# Patient Record
Sex: FEMALE | Race: WHITE | NOT HISPANIC OR LATINO | Employment: UNEMPLOYED | URBAN - METROPOLITAN AREA
[De-identification: names, ages, dates, MRNs, and addresses within clinical notes are randomized per-mention and may not be internally consistent; named-entity substitution may affect disease eponyms.]

---

## 2020-10-23 ENCOUNTER — LAB VISIT (OUTPATIENT)
Dept: LAB | Facility: OTHER | Age: 70
End: 2020-10-23
Attending: INTERNAL MEDICINE
Payer: MEDICARE

## 2020-10-23 DIAGNOSIS — E78.5 HYPERLIPIDEMIA: Primary | ICD-10-CM

## 2020-10-23 DIAGNOSIS — E11.9 DIABETES: ICD-10-CM

## 2020-10-23 LAB
ALBUMIN SERPL BCP-MCNC: 4.1 G/DL (ref 3.5–5.2)
ALP SERPL-CCNC: 66 U/L (ref 55–135)
ALT SERPL W/O P-5'-P-CCNC: 20 U/L (ref 10–44)
ANION GAP SERPL CALC-SCNC: 8 MMOL/L (ref 8–16)
AST SERPL-CCNC: 26 U/L (ref 10–40)
BASOPHILS # BLD AUTO: 0.04 K/UL (ref 0–0.2)
BASOPHILS NFR BLD: 1 % (ref 0–1.9)
BILIRUB DIRECT SERPL-MCNC: 0.3 MG/DL (ref 0.1–0.3)
BILIRUB SERPL-MCNC: 0.6 MG/DL (ref 0.1–1)
BILIRUB UR QL STRIP: NEGATIVE
BUN SERPL-MCNC: 18 MG/DL (ref 8–23)
CALCIUM SERPL-MCNC: 9.2 MG/DL (ref 8.7–10.5)
CHLORIDE SERPL-SCNC: 105 MMOL/L (ref 95–110)
CHOLEST SERPL-MCNC: 197 MG/DL (ref 120–199)
CHOLEST/HDLC SERPL: 2.4 {RATIO} (ref 2–5)
CLARITY UR: CLEAR
CO2 SERPL-SCNC: 28 MMOL/L (ref 23–29)
COLOR UR: YELLOW
CREAT SERPL-MCNC: 0.8 MG/DL (ref 0.5–1.4)
CRP SERPL-MCNC: 0.24 MG/L (ref 0–3.19)
DIFFERENTIAL METHOD: ABNORMAL
EOSINOPHIL # BLD AUTO: 0.1 K/UL (ref 0–0.5)
EOSINOPHIL NFR BLD: 2 % (ref 0–8)
ERYTHROCYTE [DISTWIDTH] IN BLOOD BY AUTOMATED COUNT: 12.1 % (ref 11.5–14.5)
EST. GFR  (AFRICAN AMERICAN): >60 ML/MIN/1.73 M^2
EST. GFR  (NON AFRICAN AMERICAN): >60 ML/MIN/1.73 M^2
ESTIMATED AVG GLUCOSE: 108 MG/DL (ref 68–131)
GLUCOSE SERPL-MCNC: 95 MG/DL (ref 70–110)
GLUCOSE UR QL STRIP: NEGATIVE
HBA1C MFR BLD HPLC: 5.4 % (ref 4–5.6)
HCT VFR BLD AUTO: 42.1 % (ref 37–48.5)
HDLC SERPL-MCNC: 83 MG/DL (ref 40–75)
HDLC SERPL: 42.1 % (ref 20–50)
HGB BLD-MCNC: 13.7 G/DL (ref 12–16)
HGB UR QL STRIP: NEGATIVE
IMM GRANULOCYTES # BLD AUTO: 0 K/UL (ref 0–0.04)
IMM GRANULOCYTES NFR BLD AUTO: 0 % (ref 0–0.5)
KETONES UR QL STRIP: NEGATIVE
LDLC SERPL CALC-MCNC: 105.4 MG/DL (ref 63–159)
LEUKOCYTE ESTERASE UR QL STRIP: NEGATIVE
LYMPHOCYTES # BLD AUTO: 1.6 K/UL (ref 1–4.8)
LYMPHOCYTES NFR BLD: 40.6 % (ref 18–48)
MCH RBC QN AUTO: 31.5 PG (ref 27–31)
MCHC RBC AUTO-ENTMCNC: 32.5 G/DL (ref 32–36)
MCV RBC AUTO: 97 FL (ref 82–98)
MONOCYTES # BLD AUTO: 0.3 K/UL (ref 0.3–1)
MONOCYTES NFR BLD: 7.1 % (ref 4–15)
NEUTROPHILS # BLD AUTO: 2 K/UL (ref 1.8–7.7)
NEUTROPHILS NFR BLD: 49.3 % (ref 38–73)
NITRITE UR QL STRIP: NEGATIVE
NONHDLC SERPL-MCNC: 114 MG/DL
NRBC BLD-RTO: 0 /100 WBC
PH UR STRIP: 8 [PH] (ref 5–8)
PLATELET # BLD AUTO: 247 K/UL (ref 150–350)
PMV BLD AUTO: 9.5 FL (ref 9.2–12.9)
POTASSIUM SERPL-SCNC: 4 MMOL/L (ref 3.5–5.1)
PROT SERPL-MCNC: 6.7 G/DL (ref 6–8.4)
PROT UR QL STRIP: NEGATIVE
RBC # BLD AUTO: 4.35 M/UL (ref 4–5.4)
SODIUM SERPL-SCNC: 141 MMOL/L (ref 136–145)
SP GR UR STRIP: 1.01 (ref 1–1.03)
TRIGL SERPL-MCNC: 43 MG/DL (ref 30–150)
URN SPEC COLLECT METH UR: NORMAL
UROBILINOGEN UR STRIP-ACNC: NEGATIVE EU/DL
WBC # BLD AUTO: 3.97 K/UL (ref 3.9–12.7)

## 2020-10-23 PROCEDURE — 86141 C-REACTIVE PROTEIN HS: CPT | Mod: DBM

## 2020-10-23 PROCEDURE — 83036 HEMOGLOBIN GLYCOSYLATED A1C: CPT | Mod: DBM

## 2020-10-23 PROCEDURE — 80061 LIPID PANEL: CPT

## 2020-10-23 PROCEDURE — 81003 URINALYSIS AUTO W/O SCOPE: CPT

## 2020-10-23 PROCEDURE — 80061 LIPID PANEL: CPT | Mod: 91

## 2020-10-23 PROCEDURE — 85025 COMPLETE CBC W/AUTO DIFF WBC: CPT

## 2020-10-23 PROCEDURE — 80076 HEPATIC FUNCTION PANEL: CPT

## 2020-10-23 PROCEDURE — 80048 BASIC METABOLIC PNL TOTAL CA: CPT

## 2020-10-26 LAB — LDLC SERPL-MCNC: 111 MG/DL

## 2020-10-28 LAB
CHOLEST SERPL-MCNC: 97 MG/DL
HDL SERPL QN: 8.8 NM
HDL SERPL-SCNC: 25.6 UMOL/L
HDLC SERPL-MCNC: 40 MG/DL (ref 40–59)
HLD.LARGE SERPL-SCNC: 2.9 UMOL/L
LDL SERPL QN: 20.6 NM
LDL SERPL-SCNC: 739 NMOL/L
LDL SMALL SERPL-SCNC: 460 NMOL/L
LDLC SERPL CALC-MCNC: 48 MG/DL
PATHOLOGY STUDY: ABNORMAL
TRIGL SERPL-MCNC: 44 MG/DL (ref 30–149)
VLDL LARGE SERPL-SCNC: <1.5 NMOL/L
VLDL SERPL QN: 49.1 NM

## 2020-12-16 ENCOUNTER — LAB VISIT (OUTPATIENT)
Dept: LAB | Facility: OTHER | Age: 70
End: 2020-12-16
Payer: MEDICARE

## 2020-12-16 DIAGNOSIS — E78.00 HYPERCHOLESTEROLEMIA: ICD-10-CM

## 2020-12-16 DIAGNOSIS — R73.9 HYPERGLYCEMIA: Primary | ICD-10-CM

## 2020-12-16 LAB
CHOLEST SERPL-MCNC: 184 MG/DL (ref 120–199)
CHOLEST/HDLC SERPL: 2.1 {RATIO} (ref 2–5)
ESTIMATED AVG GLUCOSE: 111 MG/DL (ref 68–131)
GLUCOSE SERPL-MCNC: 100 MG/DL (ref 70–110)
HBA1C MFR BLD HPLC: 5.5 % (ref 4–5.6)
HDLC SERPL-MCNC: 88 MG/DL (ref 40–75)
HDLC SERPL: 47.8 % (ref 20–50)
LDLC SERPL CALC-MCNC: 87.8 MG/DL (ref 63–159)
NONHDLC SERPL-MCNC: 96 MG/DL
TRIGL SERPL-MCNC: 41 MG/DL (ref 30–150)

## 2020-12-16 PROCEDURE — 36415 COLL VENOUS BLD VENIPUNCTURE: CPT

## 2020-12-16 PROCEDURE — 80061 LIPID PANEL: CPT

## 2020-12-16 PROCEDURE — 80061 LIPID PANEL: CPT | Mod: 91

## 2020-12-16 PROCEDURE — 83036 HEMOGLOBIN GLYCOSYLATED A1C: CPT

## 2020-12-16 PROCEDURE — 82947 ASSAY GLUCOSE BLOOD QUANT: CPT

## 2020-12-18 LAB — LDLC SERPL-MCNC: 98 MG/DL

## 2020-12-20 LAB
CHOLEST SERPL-MCNC: 188 MG/DL
HDL SERPL QN: 10 NM
HDL SERPL-SCNC: 36.2 UMOL/L
HDLC SERPL-MCNC: 89 MG/DL (ref 40–59)
HLD.LARGE SERPL-SCNC: 14.4 UMOL/L
LDL SERPL QN: 21.2 NM
LDL SERPL-SCNC: 914 NMOL/L
LDL SMALL SERPL-SCNC: <165 NMOL/L
LDLC SERPL CALC-MCNC: 90 MG/DL
PATHOLOGY STUDY: ABNORMAL
TRIGL SERPL-MCNC: 45 MG/DL (ref 30–149)
VLDL LARGE SERPL-SCNC: <1.5 NMOL/L
VLDL SERPL QN: 46.5 NM

## 2021-01-11 ENCOUNTER — OFFICE VISIT (OUTPATIENT)
Dept: DERMATOLOGY | Facility: CLINIC | Age: 71
End: 2021-01-11
Payer: MEDICARE

## 2021-01-11 DIAGNOSIS — R23.3 TALON NOIR: Primary | ICD-10-CM

## 2021-01-11 DIAGNOSIS — D18.01 CHERRY ANGIOMA: ICD-10-CM

## 2021-01-11 DIAGNOSIS — Z12.83 SKIN CANCER SCREENING: ICD-10-CM

## 2021-01-11 PROCEDURE — 99203 OFFICE O/P NEW LOW 30 MIN: CPT | Mod: S$PBB,,, | Performed by: DERMATOLOGY

## 2021-01-11 PROCEDURE — 99999 PR PBB SHADOW E&M-EST. PATIENT-LVL I: ICD-10-PCS | Mod: PBBFAC,,,

## 2021-01-11 PROCEDURE — 99211 OFF/OP EST MAY X REQ PHY/QHP: CPT | Mod: PBBFAC

## 2021-01-11 PROCEDURE — 99999 PR PBB SHADOW E&M-EST. PATIENT-LVL I: CPT | Mod: PBBFAC,,,

## 2021-01-11 PROCEDURE — 99203 PR OFFICE/OUTPT VISIT, NEW, LEVL III, 30-44 MIN: ICD-10-PCS | Mod: S$PBB,,, | Performed by: DERMATOLOGY

## 2021-05-12 ENCOUNTER — PATIENT MESSAGE (OUTPATIENT)
Dept: UROLOGY | Facility: CLINIC | Age: 71
End: 2021-05-12

## 2021-05-14 ENCOUNTER — OFFICE VISIT (OUTPATIENT)
Dept: UROGYNECOLOGY | Facility: CLINIC | Age: 71
End: 2021-05-14
Payer: MEDICARE

## 2021-05-14 ENCOUNTER — LAB VISIT (OUTPATIENT)
Dept: LAB | Facility: OTHER | Age: 71
End: 2021-05-14
Attending: INTERNAL MEDICINE
Payer: MEDICARE

## 2021-05-14 VITALS
BODY MASS INDEX: 20.99 KG/M2 | DIASTOLIC BLOOD PRESSURE: 68 MMHG | SYSTOLIC BLOOD PRESSURE: 119 MMHG | HEIGHT: 68 IN | WEIGHT: 138.5 LBS

## 2021-05-14 DIAGNOSIS — R33.9 INCOMPLETE BLADDER EMPTYING: Primary | ICD-10-CM

## 2021-05-14 DIAGNOSIS — E78.5 HYPERLIPIDEMIA: Primary | ICD-10-CM

## 2021-05-14 DIAGNOSIS — N39.8 DYSFUNCTIONAL VOIDING OF URINE: ICD-10-CM

## 2021-05-14 DIAGNOSIS — R73.03 PREDIABETES: ICD-10-CM

## 2021-05-14 LAB
ALBUMIN SERPL BCP-MCNC: 3.8 G/DL (ref 3.5–5.2)
ALP SERPL-CCNC: 71 U/L (ref 55–135)
ALT SERPL W/O P-5'-P-CCNC: 24 U/L (ref 10–44)
ANION GAP SERPL CALC-SCNC: 7 MMOL/L (ref 8–16)
AST SERPL-CCNC: 27 U/L (ref 10–40)
BASOPHILS # BLD AUTO: 0.04 K/UL (ref 0–0.2)
BASOPHILS NFR BLD: 0.8 % (ref 0–1.9)
BILIRUB DIRECT SERPL-MCNC: 0.2 MG/DL (ref 0.1–0.3)
BILIRUB SERPL-MCNC: 0.6 MG/DL (ref 0.1–1)
BILIRUB UR QL STRIP: NEGATIVE
BUN SERPL-MCNC: 13 MG/DL (ref 8–23)
CALCIUM SERPL-MCNC: 8.8 MG/DL (ref 8.7–10.5)
CHLORIDE SERPL-SCNC: 106 MMOL/L (ref 95–110)
CHOLEST SERPL-MCNC: 143 MG/DL (ref 120–199)
CO2 SERPL-SCNC: 29 MMOL/L (ref 23–29)
CREAT SERPL-MCNC: 0.8 MG/DL (ref 0.5–1.4)
CRP SERPL-MCNC: 0.2 MG/L (ref 0–8.2)
DIFFERENTIAL METHOD: ABNORMAL
EOSINOPHIL # BLD AUTO: 0.1 K/UL (ref 0–0.5)
EOSINOPHIL NFR BLD: 2.1 % (ref 0–8)
ERYTHROCYTE [DISTWIDTH] IN BLOOD BY AUTOMATED COUNT: 12.5 % (ref 11.5–14.5)
EST. GFR  (AFRICAN AMERICAN): >60 ML/MIN/1.73 M^2
EST. GFR  (NON AFRICAN AMERICAN): >60 ML/MIN/1.73 M^2
ESTIMATED AVG GLUCOSE: 114 MG/DL (ref 68–131)
GLUCOSE SERPL-MCNC: 93 MG/DL (ref 70–110)
GLUCOSE UR QL STRIP: NEGATIVE
HBA1C MFR BLD: 5.6 % (ref 4–5.6)
HCT VFR BLD AUTO: 41.7 % (ref 37–48.5)
HDLC SERPL-MCNC: 83 MG/DL (ref 40–75)
HGB BLD-MCNC: 13.7 G/DL (ref 12–16)
IMM GRANULOCYTES # BLD AUTO: 0.01 K/UL (ref 0–0.04)
IMM GRANULOCYTES NFR BLD AUTO: 0.2 % (ref 0–0.5)
KETONES UR QL STRIP: NEGATIVE
LEUKOCYTE ESTERASE UR QL STRIP: NEGATIVE
LYMPHOCYTES # BLD AUTO: 1.3 K/UL (ref 1–4.8)
LYMPHOCYTES NFR BLD: 25.2 % (ref 18–48)
MCH RBC QN AUTO: 31.9 PG (ref 27–31)
MCHC RBC AUTO-ENTMCNC: 32.9 G/DL (ref 32–36)
MCV RBC AUTO: 97 FL (ref 82–98)
MONOCYTES # BLD AUTO: 0.3 K/UL (ref 0.3–1)
MONOCYTES NFR BLD: 6.6 % (ref 4–15)
NEUTROPHILS # BLD AUTO: 3.3 K/UL (ref 1.8–7.7)
NEUTROPHILS NFR BLD: 65.1 % (ref 38–73)
NRBC BLD-RTO: 0 /100 WBC
PH, POC UA: 6
PLATELET # BLD AUTO: 237 K/UL (ref 150–450)
PMV BLD AUTO: 9.8 FL (ref 9.2–12.9)
POC BLOOD, URINE: NEGATIVE
POC NITRATES, URINE: NEGATIVE
POTASSIUM SERPL-SCNC: 4.1 MMOL/L (ref 3.5–5.1)
PROT SERPL-MCNC: 6.7 G/DL (ref 6–8.4)
PROT UR QL STRIP: NEGATIVE
RBC # BLD AUTO: 4.3 M/UL (ref 4–5.4)
SODIUM SERPL-SCNC: 142 MMOL/L (ref 136–145)
SP GR UR STRIP: 1.01 (ref 1–1.03)
TRIGL SERPL-MCNC: 35 MG/DL (ref 30–150)
UROBILINOGEN UR STRIP-ACNC: 0.1 (ref 0.1–1.1)
WBC # BLD AUTO: 5.12 K/UL (ref 3.9–12.7)

## 2021-05-14 PROCEDURE — 82465 ASSAY BLD/SERUM CHOLESTEROL: CPT | Performed by: INTERNAL MEDICINE

## 2021-05-14 PROCEDURE — 99213 OFFICE O/P EST LOW 20 MIN: CPT | Mod: PBBFAC | Performed by: OBSTETRICS & GYNECOLOGY

## 2021-05-14 PROCEDURE — 99203 OFFICE O/P NEW LOW 30 MIN: CPT | Mod: S$PBB,,, | Performed by: OBSTETRICS & GYNECOLOGY

## 2021-05-14 PROCEDURE — 83704 LIPOPROTEIN BLD QUAN PART: CPT | Performed by: INTERNAL MEDICINE

## 2021-05-14 PROCEDURE — 99999 PR PBB SHADOW E&M-EST. PATIENT-LVL III: ICD-10-PCS | Mod: PBBFAC,,, | Performed by: OBSTETRICS & GYNECOLOGY

## 2021-05-14 PROCEDURE — 86140 C-REACTIVE PROTEIN: CPT | Performed by: INTERNAL MEDICINE

## 2021-05-14 PROCEDURE — 83718 ASSAY OF LIPOPROTEIN: CPT | Performed by: INTERNAL MEDICINE

## 2021-05-14 PROCEDURE — 83036 HEMOGLOBIN GLYCOSYLATED A1C: CPT | Performed by: INTERNAL MEDICINE

## 2021-05-14 PROCEDURE — 36415 COLL VENOUS BLD VENIPUNCTURE: CPT | Performed by: INTERNAL MEDICINE

## 2021-05-14 PROCEDURE — 87086 URINE CULTURE/COLONY COUNT: CPT | Performed by: OBSTETRICS & GYNECOLOGY

## 2021-05-14 PROCEDURE — 80048 BASIC METABOLIC PNL TOTAL CA: CPT | Performed by: INTERNAL MEDICINE

## 2021-05-14 PROCEDURE — 99203 PR OFFICE/OUTPT VISIT, NEW, LEVL III, 30-44 MIN: ICD-10-PCS | Mod: S$PBB,,, | Performed by: OBSTETRICS & GYNECOLOGY

## 2021-05-14 PROCEDURE — 84478 ASSAY OF TRIGLYCERIDES: CPT | Performed by: INTERNAL MEDICINE

## 2021-05-14 PROCEDURE — 99999 PR PBB SHADOW E&M-EST. PATIENT-LVL III: CPT | Mod: PBBFAC,,, | Performed by: OBSTETRICS & GYNECOLOGY

## 2021-05-14 PROCEDURE — 85025 COMPLETE CBC W/AUTO DIFF WBC: CPT | Performed by: INTERNAL MEDICINE

## 2021-05-14 PROCEDURE — 80076 HEPATIC FUNCTION PANEL: CPT | Performed by: INTERNAL MEDICINE

## 2021-05-14 RX ORDER — EZETIMIBE AND SIMVASTATIN 10; 20 MG/1; MG/1
TABLET ORAL
COMMUNITY
Start: 2017-01-10

## 2021-05-15 LAB — BACTERIA UR CULT: NO GROWTH

## 2021-05-18 LAB — LDLC SERPL-MCNC: 70 MG/DL

## 2021-05-19 LAB
CHOLEST SERPL-MCNC: 154 MG/DL
HDL SERPL QN: 9.9 NM
HDL SERPL-SCNC: 38.8 UMOL/L
HDLC SERPL-MCNC: 81 MG/DL (ref 40–59)
HLD.LARGE SERPL-SCNC: 13.6 UMOL/L
LDL SERPL QN: 20.9 NM
LDL SERPL-SCNC: 686 NMOL/L
LDL SMALL SERPL-SCNC: <165 NMOL/L
LDLC SERPL CALC-MCNC: 65 MG/DL
PATHOLOGY STUDY: ABNORMAL
TRIGL SERPL-MCNC: 42 MG/DL (ref 30–149)
VLDL LARGE SERPL-SCNC: <1.5 NMOL/L
VLDL SERPL QN: 46.2 NM

## 2022-06-08 ENCOUNTER — LAB VISIT (OUTPATIENT)
Dept: LAB | Facility: OTHER | Age: 72
End: 2022-06-08
Attending: INTERNAL MEDICINE
Payer: MEDICARE

## 2022-06-08 DIAGNOSIS — E78.5 HYPERLIPEMIA: Primary | ICD-10-CM

## 2022-06-08 DIAGNOSIS — E78.00 PURE HYPERCHOLESTEROLEMIA: ICD-10-CM

## 2022-06-08 DIAGNOSIS — R73.9 HYPERGLYCEMIA: ICD-10-CM

## 2022-06-08 LAB
ALBUMIN SERPL BCP-MCNC: 3.7 G/DL (ref 3.5–5.2)
ALBUMIN SERPL BCP-MCNC: 3.7 G/DL (ref 3.5–5.2)
ALP SERPL-CCNC: 65 U/L (ref 55–135)
ALP SERPL-CCNC: 65 U/L (ref 55–135)
ALT SERPL W/O P-5'-P-CCNC: 20 U/L (ref 10–44)
ALT SERPL W/O P-5'-P-CCNC: 20 U/L (ref 10–44)
ANION GAP SERPL CALC-SCNC: 8 MMOL/L (ref 8–16)
AST SERPL-CCNC: 23 U/L (ref 10–40)
AST SERPL-CCNC: 23 U/L (ref 10–40)
BASOPHILS # BLD AUTO: 0.06 K/UL (ref 0–0.2)
BASOPHILS NFR BLD: 1.4 % (ref 0–1.9)
BILIRUB DIRECT SERPL-MCNC: 0.3 MG/DL (ref 0.1–0.3)
BILIRUB SERPL-MCNC: 0.7 MG/DL (ref 0.1–1)
BILIRUB SERPL-MCNC: 0.7 MG/DL (ref 0.1–1)
BILIRUB UR QL STRIP: NEGATIVE
BUN SERPL-MCNC: 21 MG/DL (ref 8–23)
CALCIUM SERPL-MCNC: 9.8 MG/DL (ref 8.7–10.5)
CHLORIDE SERPL-SCNC: 105 MMOL/L (ref 95–110)
CHOLEST SERPL-MCNC: 153 MG/DL (ref 120–199)
CHOLEST/HDLC SERPL: 2.1 {RATIO} (ref 2–5)
CLARITY UR: CLEAR
CO2 SERPL-SCNC: 29 MMOL/L (ref 23–29)
COLOR UR: YELLOW
CREAT SERPL-MCNC: 0.8 MG/DL (ref 0.5–1.4)
CRP SERPL-MCNC: 0.37 MG/L (ref 0–3.19)
DIFFERENTIAL METHOD: ABNORMAL
EOSINOPHIL # BLD AUTO: 0.2 K/UL (ref 0–0.5)
EOSINOPHIL NFR BLD: 3.9 % (ref 0–8)
ERYTHROCYTE [DISTWIDTH] IN BLOOD BY AUTOMATED COUNT: 12.2 % (ref 11.5–14.5)
EST. GFR  (AFRICAN AMERICAN): >60 ML/MIN/1.73 M^2
EST. GFR  (NON AFRICAN AMERICAN): >60 ML/MIN/1.73 M^2
ESTIMATED AVG GLUCOSE: 108 MG/DL (ref 68–131)
GLUCOSE SERPL-MCNC: 101 MG/DL (ref 70–110)
GLUCOSE UR QL STRIP: NEGATIVE
HBA1C MFR BLD: 5.4 % (ref 4–5.6)
HCT VFR BLD AUTO: 42.7 % (ref 37–48.5)
HDLC SERPL-MCNC: 73 MG/DL (ref 40–75)
HDLC SERPL: 47.7 % (ref 20–50)
HGB BLD-MCNC: 14.1 G/DL (ref 12–16)
HGB UR QL STRIP: NEGATIVE
IMM GRANULOCYTES # BLD AUTO: 0.01 K/UL (ref 0–0.04)
IMM GRANULOCYTES NFR BLD AUTO: 0.2 % (ref 0–0.5)
KETONES UR QL STRIP: NEGATIVE
LDLC SERPL CALC-MCNC: 70.8 MG/DL (ref 63–159)
LEUKOCYTE ESTERASE UR QL STRIP: NEGATIVE
LYMPHOCYTES # BLD AUTO: 1.7 K/UL (ref 1–4.8)
LYMPHOCYTES NFR BLD: 40.3 % (ref 18–48)
MCH RBC QN AUTO: 32.3 PG (ref 27–31)
MCHC RBC AUTO-ENTMCNC: 33 G/DL (ref 32–36)
MCV RBC AUTO: 98 FL (ref 82–98)
MONOCYTES # BLD AUTO: 0.3 K/UL (ref 0.3–1)
MONOCYTES NFR BLD: 6.3 % (ref 4–15)
NEUTROPHILS # BLD AUTO: 2 K/UL (ref 1.8–7.7)
NEUTROPHILS NFR BLD: 47.9 % (ref 38–73)
NITRITE UR QL STRIP: NEGATIVE
NONHDLC SERPL-MCNC: 80 MG/DL
NRBC BLD-RTO: 0 /100 WBC
PH UR STRIP: 6 [PH] (ref 5–8)
PLATELET # BLD AUTO: 242 K/UL (ref 150–450)
PMV BLD AUTO: 9.2 FL (ref 9.2–12.9)
POTASSIUM SERPL-SCNC: 4.3 MMOL/L (ref 3.5–5.1)
PROT SERPL-MCNC: 6.6 G/DL (ref 6–8.4)
PROT SERPL-MCNC: 6.6 G/DL (ref 6–8.4)
PROT UR QL STRIP: NEGATIVE
RBC # BLD AUTO: 4.37 M/UL (ref 4–5.4)
SODIUM SERPL-SCNC: 142 MMOL/L (ref 136–145)
SP GR UR STRIP: 1.01 (ref 1–1.03)
TRIGL SERPL-MCNC: 46 MG/DL (ref 30–150)
URN SPEC COLLECT METH UR: NORMAL
UROBILINOGEN UR STRIP-ACNC: NEGATIVE EU/DL
WBC # BLD AUTO: 4.14 K/UL (ref 3.9–12.7)

## 2022-06-08 PROCEDURE — 81003 URINALYSIS AUTO W/O SCOPE: CPT | Performed by: INTERNAL MEDICINE

## 2022-06-08 PROCEDURE — 83036 HEMOGLOBIN GLYCOSYLATED A1C: CPT | Performed by: INTERNAL MEDICINE

## 2022-06-08 PROCEDURE — 83701 LIPOPROTEIN BLD HR FRACTION: CPT | Performed by: INTERNAL MEDICINE

## 2022-06-08 PROCEDURE — 85025 COMPLETE CBC W/AUTO DIFF WBC: CPT | Performed by: INTERNAL MEDICINE

## 2022-06-08 PROCEDURE — 86141 C-REACTIVE PROTEIN HS: CPT | Performed by: INTERNAL MEDICINE

## 2022-06-08 PROCEDURE — 80061 LIPID PANEL: CPT | Mod: 91 | Performed by: INTERNAL MEDICINE

## 2022-06-08 PROCEDURE — 80053 COMPREHEN METABOLIC PANEL: CPT | Performed by: INTERNAL MEDICINE

## 2022-06-08 PROCEDURE — 80061 LIPID PANEL: CPT | Performed by: INTERNAL MEDICINE

## 2022-06-12 LAB
CHOLEST SERPL-MCNC: 159 MG/DL
HDL SERPL QN: 10.1 NM
HDL SERPL-SCNC: 35 UMOL/L
HDLC SERPL-MCNC: 83 MG/DL (ref 40–59)
HLD.LARGE SERPL-SCNC: 13.9 UMOL/L
LDL SERPL QN: 21.1 NM
LDL SERPL-SCNC: 656 NMOL/L
LDL SMALL SERPL-SCNC: <165 NMOL/L
LDLC SERPL CALC-MCNC: 65 MG/DL
PATHOLOGY STUDY: ABNORMAL
TRIGL SERPL-MCNC: 54 MG/DL (ref 30–149)
VLDL LARGE SERPL-SCNC: <1.5 NMOL/L
VLDL SERPL QN: 47.9 NM

## 2022-06-13 LAB — LDLC SERPL-MCNC: 71 MG/DL

## 2023-01-31 ENCOUNTER — PES CALL (OUTPATIENT)
Dept: ADMINISTRATIVE | Facility: CLINIC | Age: 73
End: 2023-01-31
Payer: MEDICARE

## 2023-02-27 ENCOUNTER — PATIENT OUTREACH (OUTPATIENT)
Dept: ADMINISTRATIVE | Facility: HOSPITAL | Age: 73
End: 2023-02-27
Payer: MEDICARE

## 2023-03-06 ENCOUNTER — PES CALL (OUTPATIENT)
Dept: ADMINISTRATIVE | Facility: CLINIC | Age: 73
End: 2023-03-06
Payer: MEDICARE

## 2023-03-06 ENCOUNTER — TELEPHONE (OUTPATIENT)
Dept: INTERNAL MEDICINE | Facility: CLINIC | Age: 73
End: 2023-03-06
Payer: MEDICARE

## 2023-03-06 NOTE — TELEPHONE ENCOUNTER
----- Message from Yuridia Cam sent at 3/6/2023 10:28 AM CST -----  Type:   Appointment Request    .    Name of Caller Samson   When is the first available appointment?N/a   Symptoms:check up   Best Call Back Number:[phone]   Additional Information:

## 2023-03-27 ENCOUNTER — OFFICE VISIT (OUTPATIENT)
Dept: SPORTS MEDICINE | Facility: CLINIC | Age: 73
End: 2023-03-27
Payer: MEDICARE

## 2023-03-27 ENCOUNTER — HOSPITAL ENCOUNTER (OUTPATIENT)
Dept: RADIOLOGY | Facility: HOSPITAL | Age: 73
Discharge: HOME | End: 2023-03-27
Attending: ORTHOPAEDIC SURGERY
Payer: MEDICARE

## 2023-03-27 VITALS
BODY MASS INDEX: 20.92 KG/M2 | SYSTOLIC BLOOD PRESSURE: 117 MMHG | HEART RATE: 67 BPM | WEIGHT: 138 LBS | HEIGHT: 68 IN | DIASTOLIC BLOOD PRESSURE: 80 MMHG

## 2023-03-27 DIAGNOSIS — M99.06 SOMATIC DYSFUNCTION OF LOWER EXTREMITY: ICD-10-CM

## 2023-03-27 DIAGNOSIS — M25.552 LEFT HIP PAIN: ICD-10-CM

## 2023-03-27 DIAGNOSIS — M99.05 SOMATIC DYSFUNCTION OF PELVIS REGION: ICD-10-CM

## 2023-03-27 DIAGNOSIS — S76.312A STRAIN OF LEFT HAMSTRING MUSCLE, INITIAL ENCOUNTER: ICD-10-CM

## 2023-03-27 DIAGNOSIS — M25.552 POSTERIOR PAIN OF HIP, LEFT: Primary | ICD-10-CM

## 2023-03-27 DIAGNOSIS — M99.04 SOMATIC DYSFUNCTION OF SACRAL REGION: ICD-10-CM

## 2023-03-27 DIAGNOSIS — M99.02 SOMATIC DYSFUNCTION OF THORACIC REGION: ICD-10-CM

## 2023-03-27 DIAGNOSIS — M99.03 SOMATIC DYSFUNCTION OF LUMBAR REGION: ICD-10-CM

## 2023-03-27 PROCEDURE — 73502 X-RAY EXAM HIP UNI 2-3 VIEWS: CPT | Mod: TC,LT

## 2023-03-27 PROCEDURE — 99213 OFFICE O/P EST LOW 20 MIN: CPT | Mod: PBBFAC | Performed by: ORTHOPAEDIC SURGERY

## 2023-03-27 PROCEDURE — 99999 PR PBB SHADOW E&M-EST. PATIENT-LVL III: CPT | Mod: PBBFAC,,, | Performed by: ORTHOPAEDIC SURGERY

## 2023-03-27 PROCEDURE — 97110 PR THERAPEUTIC EXERCISES: ICD-10-PCS | Mod: GP,,, | Performed by: ORTHOPAEDIC SURGERY

## 2023-03-27 PROCEDURE — 99999 PR PBB SHADOW E&M-EST. PATIENT-LVL III: ICD-10-PCS | Mod: PBBFAC,,, | Performed by: ORTHOPAEDIC SURGERY

## 2023-03-27 PROCEDURE — 73502 XR HIP WITH PELVIS WHEN PERFORMED, 2 OR 3 VIEWS LEFT: ICD-10-PCS | Mod: 26,LT,, | Performed by: RADIOLOGY

## 2023-03-27 PROCEDURE — 97110 THERAPEUTIC EXERCISES: CPT | Mod: GP,,, | Performed by: ORTHOPAEDIC SURGERY

## 2023-03-27 PROCEDURE — 98927 OSTEOPATH MANJ 5-6 REGIONS: CPT | Mod: PBBFAC | Performed by: ORTHOPAEDIC SURGERY

## 2023-03-27 PROCEDURE — 99204 PR OFFICE/OUTPT VISIT, NEW, LEVL IV, 45-59 MIN: ICD-10-PCS | Mod: 25,S$PBB,, | Performed by: ORTHOPAEDIC SURGERY

## 2023-03-27 PROCEDURE — 73502 X-RAY EXAM HIP UNI 2-3 VIEWS: CPT | Mod: 26,LT,, | Performed by: RADIOLOGY

## 2023-03-27 PROCEDURE — 98927 OSTEOPATH MANJ 5-6 REGIONS: CPT | Mod: S$PBB,,, | Performed by: ORTHOPAEDIC SURGERY

## 2023-03-27 PROCEDURE — 98927 PR OSTEOPATHIC MANIP,5-6 BODY REGN: ICD-10-PCS | Mod: S$PBB,,, | Performed by: ORTHOPAEDIC SURGERY

## 2023-03-27 PROCEDURE — 99204 OFFICE O/P NEW MOD 45 MIN: CPT | Mod: 25,S$PBB,, | Performed by: ORTHOPAEDIC SURGERY

## 2023-03-27 NOTE — PROGRESS NOTES
"CC: left hip pain    72 y.o. Female presents today for evaluation of her left hip pain. Started about a year ago. She denies any trauma or injury. Pain is located over left ischial area. Notices it at night or occasionally when sitting. Occasionally wakes her up at night. It really bothers her with long strides like going up 2 stairs at a time. No numbness/tingling. Pain is intermittent and aching in nature. She loves walking and long hikes which have been making it worse.   How lon year  What makes it better: Rest, Aleve  What makes it worse: Going up stairs 2 at a time   Does it radiate: No  Attempted treatments: Tried Aleve once which helped  Pain score: 3-4/10  Any mechanical symptoms: None  Feelings of instability: None  Affecting ADLs: No    Occupation: Part time consultant/retired      PAST MEDICAL HISTORY:   Past Medical History:   Diagnosis Date    Hyperlipemia        PAST SURGICAL HISTORY:   History reviewed. No pertinent surgical history.    FAMILY HISTORY:   History reviewed. No pertinent family history.    SOCIAL HISTORY:   Social History     Socioeconomic History    Marital status:    Tobacco Use    Smoking status: Never    Smokeless tobacco: Never   Substance and Sexual Activity    Alcohol use: Not Currently    Drug use: Never    Sexual activity: Yes       MEDICATIONS:     Current Outpatient Medications:     ezetimibe-simvastatin 10-20 mg (VYTORIN) 10-20 mg per tablet, , Disp: , Rfl:     ALLERGIES:   Review of patient's allergies indicates:  No Known Allergies     PHYSICAL EXAMINATION:  /80   Pulse 67   Ht 5' 8" (1.727 m)   Wt 62.6 kg (138 lb)   BMI 20.98 kg/m²   Vitals signs and nursing note have been reviewed.  General: In no acute distress, well developed, well nourished, no diaphoresis  Eyes: EOM full and smooth, no eye redness or discharge  HENT: normocephalic and atraumatic, neck supple, trachea midline, no nasal discharge, no external ear redness or " discharge  Cardiovascular: no LE edema  Lungs: respirations non-labored, no conversational dyspnea   Abd: non-distended, no rigidity  MSK: no amputation or deformity, no swelling of extremities  Neuro: AAOx3, CN2-12 grossly intact  Skin: No rashes, warm and dry  Psychiatric: cooperative, pleasant, mood and affect appropriate for age    HIP: LEFT  The affected hip is compared to the contralateral hip.    Observation:    There is no edema, erythema, or ecchymosis in the lumbosacral region.   No obvious pelvic obliquity while standing.    No thoracolumbar scoliosis observed.    No midline skin abnormalities.  No atrophy noted in the lower limbs.    ROM:   Passive hip flexion to 120° on left and 120° on right.    Passive hip internal rotation to 45° on left and 45° on right.    Passive hip external rotation to 45° on left and 45° on right.    Passive hip abduction to 45° on left and 45° on right.    All motions above are without pain.    Tenderness To Palpation:  No tenderness at the ASIS, AIIS, PSIS, PIIS, iliac crest, pubic bones.  + mild tenderness at the left ischial tuberosity at the proximal hamstring tendon.  No tenderness throughout the lumbar spine, iliolumbar region, or posterior pelvis.  No tenderness throughout the sacrum or piriformis  No tenderness over the greater trochanteric bursa or greater/lesser trochanters.  No tenderness at the glut attachments on the greater trochanter  No tenderness over proximal IT band or hip flexor musculature.    Strength Testing:  Hip flexion - 5/5 on left and 5/5 on right  Hip extension - 5/5 on left and 5/5 on right  Hip adduction - 5/5 on left and 5/5 on right  Hip abduction - 5/5 on left and 5/5 on right  Knee flexion - 5/5 on left and 5/5 on right  Knee extension - 5/5 on left and 5/5 on right  Some mild reproduction of pain with knee flexion.    Special Tests:  Seated straight leg raise - negative  Supine straight leg raise - negative  Hamstring flexibility tighter on  the left  Quadriceps flexibility symmetric    Log roll - negative  BOOM - negative  FADIR - negative  Scour test - negative  No pain with posterior hip capsule compression    ASIS compression test - negative  SI drawer test - negative   Thigh thrust test - negative     Brianda test - negative  Julian compression test - negative    Fulcrum test - negative    Posture:  Upright and Anterior pelvic tilt with increased lumbar lordosis  Gait: Non-antalgic     TART (Tissue texture abnormality, Asymmetry,  Restriction of motion and/or Tenderness) changes:    Head:     Cervical Spine  Thoracic Spine  Lumbar Spine   C1  T1 Neutral L1 TTAL   C2  T2 Neutral L2 TTAL   C3  T3 Neutral L3 TTAL   C4  T4 Neutral L4 ERSR   C5  T5 Neutral L5 ERSR   C6  T6 Neutral     C7  T7 Neutral       T8 Neutral       T9 Neutral       T10 TTAL       T11 TTAL       T12 TTAL       Ribs:    Upper Extremity:    Abdomen:    Pelvis:  Innominate:Left anterior rotation  Pubic bone:Left interior pubic shear    Sacrum:Right on Right sacral torsion    Lower Extremity:  External tibial torsion on the left  Myofascial restriction left posterior thigh    Key   F= Flexed   E = Extended   R = Rotated   N = Neutral   S = Sidebent   TTA = tissue texture abnormality   L/R/B = left/right/bilateral (last letter)       Neurovascular Exam:  Normal gait without Trendelenburg.  2+ femoral, DP, and PT pulses BL.  No skin changes, no abnormal hair distribution.  Sensation intact to light touch throughout the obturator and medial/lateral/posterior femoral cutaneous nerves.  Capillary refill intact to <2 seconds in all lower limb digits.      IMAGIN. X-ray ordered due to left hip pain   2. X-ray images were reviewed personally by me and then directly with patient.  3. FINDINGS: X-ray images obtained demonstrate very mild osteoarthritis.  4. IMPRESSION: As above.       ASSESSMENT:      ICD-10-CM ICD-9-CM   1. Posterior pain of hip, left  M25.552 719.45   2. Strain of left  hamstring muscle, initial encounter [S76.312A (ICD-10-CM)]  S76.312A 843.8   3. Somatic dysfunction of lumbar region  M99.03 739.3   4. Somatic dysfunction of lower extremity  M99.06 739.6   5. Somatic dysfunction of pelvis region  M99.05 739.5   6. Somatic dysfunction of sacral region  M99.04 739.4   7. Somatic dysfunction of thoracic region  M99.02 739.2         PLAN:  1-2. Left hip pain/hamstring strain -     - Shahrzad admits to left posterior hip pain over the left ischial tuberosity and proximal hamstring. This started insidiously and has been intermittent over the last year. She is very active and hope to maintain her hobbies of walking and hiking.     - XRs ordered in the office today and images were personally reviewed with the patient. See above for further detail.    - Symptoms, exam, and imaging are most consistent with left proximal hamstring tendinopathy likely due to pelvic tilt and poor mechanics.  We discussed the importance of decreasing inflammation and strengthening and stabilizing to help promote and maintain symptom improvement/resolution.  This is commonly accomplished with a short course of an anti-inflammatory and icing in addition to osteopathic manipulation, a home exercise program or physical therapy.    - Based on her description/body language of pain and somatic dysfunction identified on exam, I discussed osteopathic manipulation as a treatment option today.  She consents to evaluation and treatment.  See below.    - HEP for pelvic and core strengthening and stabilizing exercises prescribed today. Handouts provided, explained, and exercises were demonstrated as needed. Encouraged to do daily. 61597 HOME EXERCISE PROGRAM (HEP):  The patient was taught a homegoing physical therapy regimen as described above by provider with assistance of sports medicine assistant. The patient demonstrated understanding of the exercises and proper technique of their execution. This interaction took 15  minutes.       3-7. Somatic dysfunction lumbar, pelvis, sacral, thoracic, lower extremity regions -     - OMT 5-6 regions. Oral consent obtained. Reviewed benefits and potential side effects. OMT indicated today due to signs and symptoms as well as local and remote somatic dysfunction findings and their related neurokinetic, lymphatic, fascial and/or arteriovenous body connections. OMT techniques used: Soft Tissue, Myofascial Release, and Muscle Energy. Treatment was tolerated well. Improvement noted in segmental mobility post-treatment in dysfunctional regions. There were no adverse events and no complications immediately following treatment. Advised plenty of water to help alleviate soreness.       Future planning includes - Repeat OMT if helpful and if indicated. Formal PT if no improvement    All questions were answered to the best of my ability and all concerns were addressed at this time.    Follow up in 3 weeks for above, or sooner if needed.      This note is dictated using the M*Modal Fluency Direct word recognition program. There are word recognition mistakes that are occasionally missed on review.      Total time spent face-to face with patient counseling or coordinating care including prognosis, differential diagnosis, risks and benefits of treatment, instructions, compliance risk reductions as well as non-face-to-face time personally spent reviewing medial record, medical documentation, and coordination of care.     EST MINUTES X   83572 10-19    76573 20-29    49863 30-39    75842 40-54    NEW     84752 15-29    86521 30-44    27985 45-59 X   99205 60-74    PHONE      5-10    52091 11-20    22512 21-30

## 2023-04-12 ENCOUNTER — PES CALL (OUTPATIENT)
Dept: ADMINISTRATIVE | Facility: CLINIC | Age: 73
End: 2023-04-12
Payer: MEDICARE

## 2023-04-13 ENCOUNTER — PATIENT MESSAGE (OUTPATIENT)
Dept: ADMINISTRATIVE | Facility: CLINIC | Age: 73
End: 2023-04-13
Payer: MEDICARE

## 2023-04-17 ENCOUNTER — TELEPHONE (OUTPATIENT)
Dept: ADMINISTRATIVE | Facility: CLINIC | Age: 73
End: 2023-04-17
Payer: MEDICARE

## 2023-04-17 NOTE — TELEPHONE ENCOUNTER
Called pt; no answer; left message informing patient I was calling to confirm her virtual EAWV on 4/18/23 at 3:30pm and to see if she needed any help with setting up for virtual appt and to login 15 minutes prior to scheduled appt; left my name & number for pt to return my call if she has any questions or concerns;

## 2023-04-18 ENCOUNTER — TELEPHONE (OUTPATIENT)
Dept: ADMINISTRATIVE | Facility: CLINIC | Age: 73
End: 2023-04-18
Payer: MEDICARE

## 2023-04-18 ENCOUNTER — PATIENT MESSAGE (OUTPATIENT)
Dept: ADMINISTRATIVE | Facility: CLINIC | Age: 73
End: 2023-04-18
Payer: MEDICARE

## 2023-04-18 ENCOUNTER — OFFICE VISIT (OUTPATIENT)
Dept: FAMILY MEDICINE | Facility: CLINIC | Age: 73
End: 2023-04-18
Payer: MEDICARE

## 2023-04-18 VITALS — HEIGHT: 68 IN | WEIGHT: 138 LBS | BODY MASS INDEX: 20.92 KG/M2

## 2023-04-18 DIAGNOSIS — Z12.12 SCREENING FOR COLORECTAL CANCER: ICD-10-CM

## 2023-04-18 DIAGNOSIS — Z12.11 SCREENING FOR COLORECTAL CANCER: ICD-10-CM

## 2023-04-18 DIAGNOSIS — Z12.4 CERVICAL CANCER SCREENING: ICD-10-CM

## 2023-04-18 DIAGNOSIS — E78.5 HYPERLIPIDEMIA, UNSPECIFIED HYPERLIPIDEMIA TYPE: ICD-10-CM

## 2023-04-18 DIAGNOSIS — Z12.31 BREAST CANCER SCREENING BY MAMMOGRAM: ICD-10-CM

## 2023-04-18 DIAGNOSIS — Z00.00 ENCOUNTER FOR PREVENTIVE HEALTH EXAMINATION: Primary | ICD-10-CM

## 2023-04-18 PROCEDURE — G0439 PR MEDICARE ANNUAL WELLNESS SUBSEQUENT VISIT: ICD-10-PCS | Mod: 95,,,

## 2023-04-18 PROCEDURE — G0439 PPPS, SUBSEQ VISIT: HCPCS | Mod: 95,,,

## 2023-04-18 RX ORDER — MULTIVITAMIN
1 TABLET ORAL DAILY
COMMUNITY

## 2023-04-18 RX ORDER — ERYTHROMYCIN 5 MG/G
OINTMENT OPHTHALMIC
COMMUNITY
Start: 2022-12-01 | End: 2023-12-01

## 2023-04-18 NOTE — PATIENT INSTRUCTIONS
Counseling and Referral of Other Preventative  (Italic type indicates deductible and co-insurance are waived)    Patient Name: Shahrzad Blanc  Today's Date: 4/18/2023    GI department will call you to schedule your colonoscopy appointment.    If you do not hear from them in 2 weeks, please call:    [facility] - [phone]  [facility] - [phone]     Health Maintenance         Date Due Completion Date    Hepatitis C Screening Never done ---    TETANUS VACCINE Never done ---    DEXA Scan Never done ---    Pneumococcal Vaccines (Age 65+) (1 - PCV) Never done ---    COVID-19 Vaccine (3 - Booster for Moderna series) 04/28/2021 3/3/2021    Colorectal Cancer Screening 06/21/2022 6/21/2021    Mammogram 06/23/2022 6/23/2021    Hemoglobin A1c (Prediabetes) 06/08/2023 6/8/2022    Lipid Panel 06/08/2027 6/8/2022          Orders Placed This Encounter   Procedures    Mammo Digital Screening Bilat    Ambulatory referral/consult to Obstetrics / Gynecology     The following information is provided to all patients.  This information is to help you find resources for any of the problems found today that may be affecting your health:                Living healthy guide: www.[facility].[place].gov      Understanding Diabetes: www.diabetes.org      Eating healthy: www.cdc.gov/healthyweight      CDC home safety checklist: www.cdc.gov/steadi/patient.html      Agency on Aging: www.goea.[place].[facility]      Alcoholics anonymous (AA): www.aa.org      Physical Activity: www.flor.nih.gov/ba7jinl      Tobacco use: www.quitwithusla.org

## 2023-04-18 NOTE — PROGRESS NOTES
The patient location is: [place]  The chief complaint leading to consultation is: Medicare Wellness Visit       Visit type: audiovisual     Face to Face time with patient: 38  60 minutes of total time spent on the encounter, which includes face to face time and non-face to face time preparing to see the patient (eg, review of tests), Obtaining and/or reviewing separately obtained history, Documenting clinical information in the electronic or other health record, Independently interpreting results (not separately reported) and communicating results to the patient/family/caregiver, or Care coordination (not separately reported).            Each patient to whom he or she provides medical services by telemedicine is:  (1) informed of the relationship between the physician and patient and the respective role of any other health care provider with respect to management of the patient; and (2) notified that he or she may decline to receive medical services by telemedicine and may withdraw from such care at any time.      Shahrzad Blanc presented for a  Medicare AWV and comprehensive Health Risk Assessment today. The following components were reviewed and updated:    Medical history  Family History  Social history  Allergies and Current Medications  Health Risk Assessment  Health Maintenance  Care Team         ** See Completed Assessments for Annual Wellness Visit within the encounter summary.**         The following assessments were completed:  Living Situation  CAGE  Depression Screening  Timed Get Up and Go  Whisper Test  Cognitive Function Screening  Nutrition Screening  ADL Screening  PAQ Screening      Review for Opioid Screening: Pt does not have Rx for Opioids      Review for Substance Use Disorders: Patient does not use substance        Current Outpatient Medications:     erythromycin (ROMYCIN) ophthalmic ointment, SMARTSI.5 Inch(es) In Eye(s) Every Night, Disp: , Rfl:     ezetimibe-simvastatin 10-20 mg  "(VYTORIN) 10-20 mg per tablet, , Disp: , Rfl:     multivitamin (ONE DAILY MULTIVITAMIN) per tablet, Take 1 tablet by mouth once daily., Disp: , Rfl:          Vitals:    04/18/23 1525   Weight: 62.6 kg (138 lb)   Height: 5' 8" (1.727 m)   PainSc:   4   PainLoc: Back      Physical Exam  Constitutional:       General: She is not in acute distress.     Appearance: Normal appearance. She is well-developed and well-groomed. She is not ill-appearing or toxic-appearing.   Pulmonary:      Effort: No respiratory distress.   Skin:     Coloration: Skin is not pale.   Neurological:      Mental Status: She is alert and oriented to person, place, and time.   Psychiatric:         Mood and Affect: Mood normal.         Speech: Speech normal.         Behavior: Behavior normal. Behavior is cooperative.         Thought Content: Thought content normal.     Physical exam limited d/t virtual visit. Patient does not appear to be in any respiratory distress. Able to speak in complete sentences without becoming short of breath.         Diagnoses and health risks identified today and associated recommendations/orders:    1. Encounter for preventive health examination      2. Hyperlipidemia, unspecified hyperlipidemia type  Chronic; stable on medications. Patient needs to establish with a new PCP.    3. Breast cancer screening by mammogram  - Mammo Digital Screening Bilat; Future    4. Screening for colorectal cancer  - Ambulatory referral/consult to Endo Procedure ; Future    5. Cervical cancer screening  - Ambulatory referral/consult to Obstetrics / Gynecology; Future      Provided Shahrzad with a 5-10 year written screening schedule and personal prevention plan. Recommendations were developed using the USPSTF age appropriate recommendations. Education, counseling, and referrals were provided as needed. After Visit Summary printed and given to patient which includes a list of additional screenings\tests needed.    Follow up in about " 1 year (around 4/18/2024).    Marleny Arreaga, NP    Advance Care Planning   I offered to discuss advanced care planning, including how to pick a person who would make decisions for you if you were unable to make them for yourself, called a health care power of , and what kind of decisions you might make such as use of life sustaining treatments such as ventilators and tube feeding when faced with a life limiting illness recorded on a living will that they will need to know. (How you want to be cared for as you near the end of your natural life)     X Patient is interested in learning more about how to make advanced directives.  I provided them paperwork and offered to discuss this with them.

## 2023-04-27 ENCOUNTER — HOSPITAL ENCOUNTER (OUTPATIENT)
Dept: RADIOLOGY | Facility: HOSPITAL | Age: 73
Discharge: HOME | End: 2023-04-27
Payer: MEDICARE

## 2023-04-27 VITALS — WEIGHT: 138 LBS | BODY MASS INDEX: 20.92 KG/M2 | HEIGHT: 68 IN

## 2023-04-27 DIAGNOSIS — Z12.31 BREAST CANCER SCREENING BY MAMMOGRAM: ICD-10-CM

## 2023-04-27 PROCEDURE — 77063 BREAST TOMOSYNTHESIS BI: CPT | Mod: 26,,, | Performed by: RADIOLOGY

## 2023-04-27 PROCEDURE — 77067 MAMMO DIGITAL SCREENING BILAT WITH TOMO: ICD-10-PCS | Mod: 26,,, | Performed by: RADIOLOGY

## 2023-04-27 PROCEDURE — 77067 SCR MAMMO BI INCL CAD: CPT | Mod: TC

## 2023-04-27 PROCEDURE — 77067 SCR MAMMO BI INCL CAD: CPT | Mod: 26,,, | Performed by: RADIOLOGY

## 2023-04-27 PROCEDURE — 77063 MAMMO DIGITAL SCREENING BILAT WITH TOMO: ICD-10-PCS | Mod: 26,,, | Performed by: RADIOLOGY

## 2023-09-19 ENCOUNTER — TELEPHONE (OUTPATIENT)
Dept: ENDOSCOPY | Facility: HOSPITAL | Age: 73
End: 2023-09-19

## 2023-09-19 ENCOUNTER — CLINICAL SUPPORT (OUTPATIENT)
Dept: ENDOSCOPY | Facility: HOSPITAL | Age: 73
End: 2023-09-19
Payer: MEDICARE

## 2023-09-19 VITALS — WEIGHT: 133 LBS | HEIGHT: 68 IN | BODY MASS INDEX: 20.16 KG/M2

## 2023-09-19 DIAGNOSIS — Z12.11 SCREENING FOR COLORECTAL CANCER: ICD-10-CM

## 2023-09-19 DIAGNOSIS — Z12.11 ENCOUNTER FOR SCREENING COLONOSCOPY: Primary | ICD-10-CM

## 2023-09-19 DIAGNOSIS — Z12.12 SCREENING FOR COLORECTAL CANCER: ICD-10-CM

## 2023-09-19 NOTE — TELEPHONE ENCOUNTER
Spoke to patient to schedule procedure(s) Colonoscopy       Physician to perform procedure(s) Dr. RUMA Cagle  Date of Procedure (s) 12/4/23  Arrival Time 11:15 AM  Time of Procedure(s) 12:15 PM   Location of Procedure(s) [place] [address]nd Floor  Type of Rx Prep sent to patient: PEG  Instructions provided to patient via [facility]    Patient was informed on the following information and verbalized understanding. Screening questionnaire reviewed with patient and complete. If procedure requires anesthesia, a responsible adult needs to be present to accompany the patient home, patient cannot drive after receiving anesthesia. Appointment details are tentative, especially check-in time. Patient will receive a prep-op call 4 days prior to confirm check-in time for procedure. If applicable the patient should contact their pharmacy to verify Rx for procedure prep is ready for pick-up. Patient was advised to call the scheduling department at [phone] if pharmacy states no Rx is available. Patient was advised to call the endoscopy scheduling department if any questions or concerns arise.      [facility] Scheduling Department

## 2023-09-19 NOTE — PLAN OF CARE
Spoke to patient to schedule procedure(s) Colonoscopy       Physician to perform procedure(s) Dr. RUMA Cagle  Date of Procedure (s) 12/4/23  Arrival Time 11:15 AM  Time of Procedure(s) 12:15 PM   Location of Procedure(s) [place] [address]nd Floor  Type of Rx Prep sent to patient: PEG  Instructions provided to patient via [facility]    Patient was informed on the following information and verbalized understanding. Screening questionnaire reviewed with patient and complete. If procedure requires anesthesia, a responsible adult needs to be present to accompany the patient home, patient cannot drive after receiving anesthesia. Appointment details are tentative, especially check-in time. Patient will receive a prep-op call 4 days prior to confirm check-in time for procedure. If applicable the patient should contact their pharmacy to verify Rx for procedure prep is ready for pick-up. Patient was advised to call the scheduling department at [phone] if pharmacy states no Rx is available. Patient was advised to call the endoscopy scheduling department if any questions or concerns arise.      [facility] Scheduling Department

## 2023-11-20 ENCOUNTER — PATIENT MESSAGE (OUTPATIENT)
Dept: ENDOSCOPY | Facility: HOSPITAL | Age: 73
End: 2023-11-20
Payer: MEDICARE

## 2023-11-21 ENCOUNTER — HOSPITAL ENCOUNTER (OUTPATIENT)
Dept: RADIOLOGY | Facility: HOSPITAL | Age: 73
Discharge: HOME | End: 2023-11-21
Payer: MEDICARE

## 2023-11-21 DIAGNOSIS — R92.8 ABNORMAL MAMMOGRAM: ICD-10-CM

## 2023-11-21 PROCEDURE — 77065 DX MAMMO INCL CAD UNI: CPT | Mod: 26,LT,, | Performed by: RADIOLOGY

## 2023-11-21 PROCEDURE — 77065 MAMMO DIGITAL DIAGNOSTIC LEFT WITH TOMO: ICD-10-PCS | Mod: 26,LT,, | Performed by: RADIOLOGY

## 2023-11-21 PROCEDURE — 77061 MAMMO DIGITAL DIAGNOSTIC LEFT WITH TOMO: ICD-10-PCS | Mod: 26,LT,, | Performed by: RADIOLOGY

## 2023-11-21 PROCEDURE — 77061 BREAST TOMOSYNTHESIS UNI: CPT | Mod: 26,LT,, | Performed by: RADIOLOGY

## 2023-11-21 PROCEDURE — 77065 DX MAMMO INCL CAD UNI: CPT | Mod: TC,LT

## 2023-11-27 ENCOUNTER — TELEPHONE (OUTPATIENT)
Dept: GASTROENTEROLOGY | Facility: CLINIC | Age: 73
End: 2023-11-27
Payer: MEDICARE

## 2023-11-27 DIAGNOSIS — Z12.11 COLON CANCER SCREENING: ICD-10-CM

## 2023-11-27 DIAGNOSIS — Z12.11 ENCOUNTER FOR SCREENING COLONOSCOPY: Primary | ICD-10-CM

## 2023-11-27 NOTE — TELEPHONE ENCOUNTER
----- Message from Robbie Orourke sent at 11/27/2023 11:10 AM CST -----  Regarding: pt advice  Contact: pt @ [phone]  Pt is calling requesting colonoscopy prep be sent to   [facility]/pharmacy #[id number] [place], [place] - [address]  [address] [address]  Phone: [phone] Fax: [phone]    Please call to advise if necessary. Thank you for everything you are doing.

## 2023-11-30 ENCOUNTER — ANESTHESIA EVENT (OUTPATIENT)
Dept: ENDOSCOPY | Facility: HOSPITAL | Age: 73
End: 2023-11-30
Payer: MEDICARE

## 2023-11-30 NOTE — ANESTHESIA PREPROCEDURE EVALUATION
2023  Shahrzad Blanc is a 72 y.o., female.  [facility]  Anesthesia Pre-Operative Evaluation       Patient Name: Shahrzad Blanc  YOB: 1950  MRN: [id number]  [facility]: [id number]      Code Status: No Order   Date of Procedure: 2023  Anesthesia: Choice Procedure: Procedure(s) (LRB):  COLONOSCOPY (N/A)  Pre-Operative Diagnosis: Encounter for screening colonoscopy [Z12.11]  Proceduralist: Surgeon(s) and Role:     * Ro Cagle MD - Primary Nurse: (Unknown)      SUBJECTIVE:   Shahrzad Blanc is a 72 y.o. female who  has a past medical history of Hyperlipemia..     she has a current medication list which includes the following long-term medication(s): ezetimibe-simvastatin 10-20 mg.     ALLERGIES:   Review of patient's allergies indicates:  No Known Allergies  LDA:          Lines/Drains/Airways       None                 Anesthesia Evaluation   MEDICATIONS:     Antibiotics (From admission, onward)      None          VTE Risk Mitigation (From admission, onward)      None              No current facility-administered medications for this encounter.     Current Outpatient Medications   Medication Sig Dispense Refill    erythromycin (ROMYCIN) ophthalmic ointment SMARTSI.5 Inch(es) In Eye(s) Every Night      ezetimibe-simvastatin 10-20 mg (VYTORIN) 10-20 mg per tablet       multivitamin (ONE DAILY MULTIVITAMIN) per tablet Take 1 tablet by mouth once daily.            History:   There are no hospital problems to display for this patient.    Surgical History:    has a past surgical history that includes Cataract extraction w/ intraocular lens  implant, bilateral.   Social History:    reports being sexually active.  reports that she has never smoked. She has never used smokeless tobacco. She reports current alcohol use of about 2.0 standard drinks of alcohol per week. She  "reports that she does not use drugs.     OBJECTIVE:     Vital Signs (Most Recent):    Vital Signs Range (Last 24H):          There is no height or weight on file to calculate BMI.   Wt Readings from Last 4 Encounters:   09/19/23 60.3 kg (133 lb)   04/27/23 62.6 kg (138 lb)   04/18/23 62.6 kg (138 lb)   03/27/23 62.6 kg (138 lb)       Significant Labs:  Lab Results   Component Value Date    WBC 4.14 06/08/2022    HGB 14.1 06/08/2022    HCT 42.7 06/08/2022     06/08/2022     06/08/2022    K 4.3 06/08/2022     06/08/2022    CREATININE 0.8 06/08/2022    BUN 21 06/08/2022    CO2 29 06/08/2022     06/08/2022    CALCIUM 9.8 06/08/2022    ALKPHOS 65 06/08/2022    ALKPHOS 65 06/08/2022    ALT 20 06/08/2022    ALT 20 06/08/2022    AST 23 06/08/2022    AST 23 06/08/2022    ALBUMIN 3.7 06/08/2022    ALBUMIN 3.7 06/08/2022    GLUF 100 12/16/2020    HGBA1C 5.4 06/08/2022     No LMP recorded. Patient is postmenopausal.  No results found for this or any previous visit (from the past 72 hour(s)).    EKG:   No results found for this or any previous visit.    TTE:  No results found for this or any previous visit.  No results found for: "EF"   No results found for this or any previous visit.  BRITNI:  No results found for this or any previous visit.  Stress Test:  No results found for this or any previous visit.     LHC:  No results found for this or any previous visit.     PFT:  No results found for: "FEV1", "FVC", "UGQ8MJO", "TLC", "DLCO"     ASSESSMENT/PLAN:        Pre-op Assessment    I have reviewed the Patient Summary Reports.     I have reviewed the Nursing Notes. I have reviewed the NPO Status.   I have reviewed the Medications.     Review of Systems  Anesthesia Hx:  No problems with previous Anesthesia             Denies Family Hx of Anesthesia complications.    Denies Personal Hx of Anesthesia complications.                    Hematology/Oncology:  Hematology Normal   Oncology Normal                     "               EENT/Dental:  EENT/Dental Normal           Cardiovascular:                hyperlipidemia                             Pulmonary:  Pulmonary Normal                       Renal/:  Renal/ Normal                 Hepatic/GI:  Hepatic/GI Normal                 Musculoskeletal:  Musculoskeletal Normal                Neurological:  Neurology Normal                                      Endocrine:  Endocrine Normal            Dermatological:  Skin Normal    Psych:  Psychiatric Normal                       Anesthesia Plan  Type of Anesthesia, risks & benefits discussed:    Anesthesia Type: Gen Natural Airway  Intra-op Monitoring Plan: Standard ASA Monitors  Post Op Pain Control Plan: multimodal analgesia  Induction:  IV  Informed Consent: Informed consent signed with the Patient and all parties understand the risks and agree with anesthesia plan.  All questions answered. Patient consented to blood products? No  ASA Score: 2  Day of Surgery Review of History & Physical: H&P Update referred to the surgeon/provider.    Ready For Surgery From Anesthesia Perspective.     .

## 2023-12-01 ENCOUNTER — OFFICE VISIT (OUTPATIENT)
Dept: OBSTETRICS AND GYNECOLOGY | Facility: CLINIC | Age: 73
End: 2023-12-01
Payer: MEDICARE

## 2023-12-01 VITALS
DIASTOLIC BLOOD PRESSURE: 80 MMHG | WEIGHT: 136.44 LBS | BODY MASS INDEX: 20.75 KG/M2 | SYSTOLIC BLOOD PRESSURE: 108 MMHG

## 2023-12-01 DIAGNOSIS — Z01.419 WOMEN'S ANNUAL ROUTINE GYNECOLOGICAL EXAMINATION: Primary | ICD-10-CM

## 2023-12-01 DIAGNOSIS — Z12.4 CERVICAL CANCER SCREENING: ICD-10-CM

## 2023-12-01 DIAGNOSIS — Z78.0 POSTMENOPAUSAL STATUS: ICD-10-CM

## 2023-12-01 DIAGNOSIS — Z12.4 ENCOUNTER FOR PAPANICOLAOU SMEAR FOR CERVICAL CANCER SCREENING: ICD-10-CM

## 2023-12-01 DIAGNOSIS — Z11.51 SCREENING FOR HUMAN PAPILLOMAVIRUS (HPV): ICD-10-CM

## 2023-12-01 PROCEDURE — 88175 CYTOPATH C/V AUTO FLUID REDO: CPT | Performed by: PHYSICIAN ASSISTANT

## 2023-12-01 PROCEDURE — G0101 CA SCREEN;PELVIC/BREAST EXAM: HCPCS | Mod: GZ,S$PBB,, | Performed by: PHYSICIAN ASSISTANT

## 2023-12-01 PROCEDURE — G0101 PR CA SCREEN;PELVIC/BREAST EXAM: ICD-10-PCS | Mod: GZ,S$PBB,, | Performed by: PHYSICIAN ASSISTANT

## 2023-12-01 PROCEDURE — G0101 CA SCREEN;PELVIC/BREAST EXAM: HCPCS | Mod: PBBFAC | Performed by: PHYSICIAN ASSISTANT

## 2023-12-01 PROCEDURE — 99213 OFFICE O/P EST LOW 20 MIN: CPT | Mod: PBBFAC,25 | Performed by: PHYSICIAN ASSISTANT

## 2023-12-01 PROCEDURE — 87624 HPV HI-RISK TYP POOLED RSLT: CPT | Performed by: PHYSICIAN ASSISTANT

## 2023-12-01 PROCEDURE — 99999 PR PBB SHADOW E&M-EST. PATIENT-LVL III: CPT | Mod: PBBFAC,,, | Performed by: PHYSICIAN ASSISTANT

## 2023-12-01 PROCEDURE — 99999 PR PBB SHADOW E&M-EST. PATIENT-LVL III: ICD-10-PCS | Mod: PBBFAC,,, | Performed by: PHYSICIAN ASSISTANT

## 2023-12-01 NOTE — H&P
Short Stay Endoscopy History and Physical    PCP - No, Primary Doctor  Referring Physician - Ro Cagle MD  [id number] [place] [address]    Procedure - Colonoscopy  ASA - per anesthesia  Mallampati - per anesthesia  History of Anesthesia problems - no  Family history Anesthesia problems -  no   Plan of anesthesia - General    HPI  72 y.o. female  Reason for procedure:   Encounter for screening colonoscopy [Z12.11]     Last cscope about 10 years ago removed one polyp   No FH of CRC     ROS:  Constitutional: No fevers, chills, No weight loss  CV: No chest pain  Pulm: No cough, No shortness of breath  GI: see HPI    Medical History:  has a past medical history of Hyperlipemia.    Surgical History:  has a past surgical history that includes Cataract extraction w/ intraocular lens  implant, bilateral.    Family History: family history includes Arthritis in her sister; Diabetes in her father; Diverticulitis in her sister; Heart disease in her father; Lung disease in her mother; No Known Problems in her daughter and son..    Social History:  reports that she has never smoked. She has never used smokeless tobacco. She reports current alcohol use of about 2.0 standard drinks of alcohol per week. She reports that she does not use drugs.    Review of patient's allergies indicates:  No Known Allergies    Medications:   Medications Prior to Admission   Medication Sig Dispense Refill Last Dose    calcium citrate-vitamin D3 315-200 mg (CITRACAL+D) 315 mg-5 mcg (200 unit) per tablet Take 2 tablets by mouth once daily.   Past Week    ezetimibe-simvastatin 10-20 mg (VYTORIN) 10-20 mg per tablet    Past Week    multivitamin (ONE DAILY MULTIVITAMIN) per tablet Take 1 tablet by mouth once daily.   Past Week       Physical Exam:    Vital Signs: There were no vitals filed for this visit.    General Appearance: Well appearing in no acute distress  Abdomen: Soft, non tender, non distended with normal bowel sounds,  no masses    Labs:  Lab Results   Component Value Date    WBC 4.14 06/08/2022    HGB 14.1 06/08/2022    HCT 42.7 06/08/2022     06/08/2022    CHOL 153 06/08/2022    TRIG 46 06/08/2022    HDL 73 06/08/2022    ALT 20 06/08/2022    ALT 20 06/08/2022    AST 23 06/08/2022    AST 23 06/08/2022     06/08/2022    K 4.3 06/08/2022     06/08/2022    CREATININE 0.8 06/08/2022    BUN 21 06/08/2022    CO2 29 06/08/2022    GLUF 100 12/16/2020    HGBA1C 5.4 06/08/2022       I have explained the risks and benefits of this endoscopic procedure to the patient including but not limited to bleeding, inflammation, infection, perforation, and death.    Assessment/Plan:     CRC Surveillance      - Proceed with colonoscopy     Ro Cagle MD  Gastroenterology   [facility]

## 2023-12-01 NOTE — PROGRESS NOTES
HISTORY OF PRESENT ILLNESS:    Shahrzad Blanc is a 72 y.o. female , presents for a routine exam.  No GYN complaints. She denies vaginal bleeding, vasomotor symptoms, vaginal dryness.  She does not want STD screening.    The patient participates in regular exercise: yes.  The patient does not smoke.  The patient wears seatbelts.   Pt denies any domestic violence.  no tattoos or blood transfusions.     SCREENING HISTORY:  PAP: 2 years ago - normal, no h/o abn pap - does reports cervical polyp   MAMMOGRAM: UTD   TC:  3.03  COLONOSCOPY:   COVID VACCINE: complete   Dexa: Due per pt     Gyn FH:  Breast cancer: none  Colon cancer: none  Ovarian cancer: none  Endometrial cancer: none    Past Medical History:   Diagnosis Date    Hyperlipemia        Past Surgical History:   Procedure Laterality Date    CATARACT EXTRACTION W/ INTRAOCULAR LENS  IMPLANT, BILATERAL          MEDICATIONS AND ALLERGIES:      Current Outpatient Medications:     erythromycin (ROMYCIN) ophthalmic ointment, SMARTSI.5 Inch(es) In Eye(s) Every Night, Disp: , Rfl:     ezetimibe-simvastatin 10-20 mg (VYTORIN) 10-20 mg per tablet, , Disp: , Rfl:     multivitamin (ONE DAILY MULTIVITAMIN) per tablet, Take 1 tablet by mouth once daily., Disp: , Rfl:     Review of patient's allergies indicates:  No Known Allergies    Family History   Problem Relation Age of Onset    Lung disease Mother     Heart disease Father     Diabetes Father     Arthritis Sister     Diverticulitis Sister     No Known Problems Daughter     No Known Problems Son        Social History     Socioeconomic History    Marital status:    Tobacco Use    Smoking status: Never    Smokeless tobacco: Never   Substance and Sexual Activity    Alcohol use: Yes     Alcohol/week: 2.0 standard drinks of alcohol     Types: 2 Glasses of wine per week    Drug use: Never    Sexual activity: Yes     Social Determinants of Health     Financial Resource Strain: Low Risk  (2023)    Overall  Financial Resource Strain (CARDIA)     Difficulty of Paying Living Expenses: Not hard at all   Food Insecurity: No Food Insecurity (4/18/2023)    Hunger Vital Sign     Worried About Running Out of Food in the Last Year: Never true     Ran Out of Food in the Last Year: Never true   Transportation Needs: No Transportation Needs (4/18/2023)    PRAPARE - Transportation     Lack of Transportation (Medical): No     Lack of Transportation (Non-Medical): No   Physical Activity: Sufficiently Active (4/18/2023)    Exercise Vital Sign     Days of Exercise per Week: 7 days     Minutes of Exercise per Session: 90 min   Stress: No Stress Concern Present (4/18/2023)    [employer] [employer] - Occupational Stress Questionnaire     Feeling of Stress : Not at all   Social Connections: Socially Integrated (4/18/2023)    Social Connection and Isolation Panel [NHANES]     Frequency of Communication with Friends and Family: More than three times a week     Frequency of Social Gatherings with Friends and Family: More than three times a week     Attends Islam Services: More than 4 times per year     Active Member of Clubs or Organizations: Yes     Attends Club or Organization Meetings: More than 4 times per year     Marital Status:    Housing Stability: Low Risk  (4/18/2023)    Housing Stability Vital Sign     Unable to Pay for Housing in the Last Year: No     Number of Places Lived in the Last Year: 1     Unstable Housing in the Last Year: No       COMPREHENSIVE GYN HISTORY:    PAP History:  Denies abnormal Paps  Infection History: Denies STDs. Denies PID.  Benign History: Denies uterine fibroids. Denies ovarian cysts. Denies endometriosis. Denies other conditions. +cervical polyp   Cancer History: Denies cervical cancer. Denies uterine cancer or hyperplasia. Denies ovarian cancer. Denies vulvar cancer or pre-cancer. Denies vaginal cancer or pre-cancer. Denies breast cancer. Denies colon  cancer.    ROS:  GENERAL: No weight changes. No swelling. No fatigue. No fever.  CARDIOVASCULAR: No chest pain. No shortness of breath. No leg cramps.   NEUROLOGICAL: No headaches. No vision changes.  BREASTS: No pain. No lumps. No discharge.  ABDOMEN: No pain. No nausea. No vomiting. No diarrhea. No constipation.  REPRODUCTIVE: No  abnormal bleeding.   VULVA: No pain. No lesions. No itching.   VAGINA: No relaxation. No itching. No odor. No discharge. No lesions.   URINARY: No incontinence. No nocturia. No frequency. No dysuria.    /80   Wt 61.9 kg (136 lb 7.4 oz)   BMI 20.75 kg/m²     PE:  APPEARANCE: Well nourished, well developed, in no acute distress.  AFFECT: WNL, alert and oriented x 3.  SKIN: No hirsutism or acne.  NECK: Neck symmetric without masses or thyromegaly.  NODES: No inguinal, cervical, axillary or femoral lymph node enlargement.  CHEST: Good respiratory effort.   ABDOMEN: Soft. No tenderness or masses. No hepatosplenomegaly. No hernias.  BREASTS: Symmetrical, no skin changes or visible lesions. No palpable masses, nipple discharge bilaterally.  PELVIC: ATROPHIC EXTERNAL FEMALE GENITALIA without lesions. Normal hair distribution. Adequate perineal body, normal urethral meatus. VAGINA DRY without lesions or discharge. CERVIX STENOTIC without lesions, discharge or tenderness. No significant cystocele or rectocele. Bimanual exam shows uterus to be normal size, regular, mobile and nontender. Adnexa without masses or tenderness.  EXTREMITIES: No edema.    DIAGNOSIS:  1. Women's annual routine gynecological examination        2. Cervical cancer screening  Ambulatory referral/consult to Obstetrics / Gynecology    HPV High Risk Genotypes, PCR      3. Screening for human papillomavirus (HPV)  HPV High Risk Genotypes, PCR      4. Encounter for Papanicolaou smear for cervical cancer screening  Liquid-Based Pap Smear, Screening      5. Postmenopausal status  DXA Bone Density Axial Skeleton 1 or more  sites            PLAN:  - Pap and HPV done today.  - Screening tests as ordered.  - Diet and exercise encouraged.  Seat belt use encouraged.  Reviewed ASCCP Pap guidelines and screening recommendations.    Counseling: indications for and frequency of periodic gynecologic exam    Discussed no longer need pap smears - pt requests one more pap.   Counseling on osteoporosis prevention, calcium supplementation, and regular weight bearing exercise listed in AVS.     The patient was also counseled today on ACS PAP guidelines, with recommendations for yearly pelvic exams unless their uterus, cervix, and ovaries were removed for benign reasons; in that case, examinations every 3-5 years are recommended.  The patient was also counseled regarding monthly breast self-examination, routine STD screening for at-risk populations, prophylactic immunizations for transmitted infections such as  HPV, Pertussis, or Influenza as appropriate, and yearly mammograms when indicated by ACS guidelines.  She was advised to see her primary care physician for all other health maintenance.    FOLLOW-UP with me annually.   Joaquina Mistry PA-C

## 2023-12-04 ENCOUNTER — HOSPITAL ENCOUNTER (OUTPATIENT)
Facility: HOSPITAL | Age: 73
Discharge: HOME | End: 2023-12-04
Attending: STUDENT IN AN ORGANIZED HEALTH CARE EDUCATION/TRAINING PROGRAM | Admitting: STUDENT IN AN ORGANIZED HEALTH CARE EDUCATION/TRAINING PROGRAM
Payer: MEDICARE

## 2023-12-04 ENCOUNTER — ANESTHESIA (OUTPATIENT)
Dept: ENDOSCOPY | Facility: HOSPITAL | Age: 73
End: 2023-12-04
Payer: MEDICARE

## 2023-12-04 VITALS
HEIGHT: 68 IN | BODY MASS INDEX: 20.16 KG/M2 | DIASTOLIC BLOOD PRESSURE: 70 MMHG | WEIGHT: 133 LBS | OXYGEN SATURATION: 100 % | RESPIRATION RATE: 16 BRPM | SYSTOLIC BLOOD PRESSURE: 128 MMHG | TEMPERATURE: 97 F | HEART RATE: 55 BPM

## 2023-12-04 DIAGNOSIS — Z12.11 COLON CANCER SCREENING: Primary | ICD-10-CM

## 2023-12-04 PROCEDURE — 63600175 PHARM REV CODE 636 W HCPCS: Performed by: REGISTERED NURSE

## 2023-12-04 PROCEDURE — 45385 COLONOSCOPY W/LESION REMOVAL: CPT | Mod: PT,,, | Performed by: STUDENT IN AN ORGANIZED HEALTH CARE EDUCATION/TRAINING PROGRAM

## 2023-12-04 PROCEDURE — 88305 TISSUE EXAM BY PATHOLOGIST: ICD-10-PCS | Mod: 26,,, | Performed by: STUDENT IN AN ORGANIZED HEALTH CARE EDUCATION/TRAINING PROGRAM

## 2023-12-04 PROCEDURE — 45385 COLONOSCOPY W/LESION REMOVAL: CPT | Mod: PT | Performed by: STUDENT IN AN ORGANIZED HEALTH CARE EDUCATION/TRAINING PROGRAM

## 2023-12-04 PROCEDURE — 25000003 PHARM REV CODE 250: Performed by: REGISTERED NURSE

## 2023-12-04 PROCEDURE — 45385 PR COLONOSCOPY,REMV LESN,SNARE: ICD-10-PCS | Mod: PT,,, | Performed by: STUDENT IN AN ORGANIZED HEALTH CARE EDUCATION/TRAINING PROGRAM

## 2023-12-04 PROCEDURE — 88305 TISSUE EXAM BY PATHOLOGIST: CPT | Mod: 26,,, | Performed by: STUDENT IN AN ORGANIZED HEALTH CARE EDUCATION/TRAINING PROGRAM

## 2023-12-04 PROCEDURE — 27201012 HC FORCEPS, HOT/COLD, DISP: Performed by: STUDENT IN AN ORGANIZED HEALTH CARE EDUCATION/TRAINING PROGRAM

## 2023-12-04 PROCEDURE — 94761 N-INVAS EAR/PLS OXIMETRY MLT: CPT

## 2023-12-04 PROCEDURE — D9220A PRA ANESTHESIA: Mod: PT,,, | Performed by: REGISTERED NURSE

## 2023-12-04 PROCEDURE — 37000008 HC ANESTHESIA 1ST 15 MINUTES: Performed by: STUDENT IN AN ORGANIZED HEALTH CARE EDUCATION/TRAINING PROGRAM

## 2023-12-04 PROCEDURE — 88305 TISSUE EXAM BY PATHOLOGIST: CPT | Performed by: STUDENT IN AN ORGANIZED HEALTH CARE EDUCATION/TRAINING PROGRAM

## 2023-12-04 PROCEDURE — D9220A PRA ANESTHESIA: ICD-10-PCS | Mod: PT,,, | Performed by: REGISTERED NURSE

## 2023-12-04 PROCEDURE — 27201089 HC SNARE, DISP (ANY): Performed by: STUDENT IN AN ORGANIZED HEALTH CARE EDUCATION/TRAINING PROGRAM

## 2023-12-04 PROCEDURE — 99900035 HC TECH TIME PER 15 MIN (STAT)

## 2023-12-04 PROCEDURE — 37000009 HC ANESTHESIA EA ADD 15 MINS: Performed by: STUDENT IN AN ORGANIZED HEALTH CARE EDUCATION/TRAINING PROGRAM

## 2023-12-04 RX ORDER — PROPOFOL 10 MG/ML
VIAL (ML) INTRAVENOUS
Status: DISCONTINUED | OUTPATIENT
Start: 2023-12-04 | End: 2023-12-04

## 2023-12-04 RX ORDER — LIDOCAINE HYDROCHLORIDE 20 MG/ML
INJECTION INTRAVENOUS
Status: DISCONTINUED | OUTPATIENT
Start: 2023-12-04 | End: 2023-12-04

## 2023-12-04 RX ORDER — LANOLIN ALCOHOL/MO/W.PET/CERES
2 CREAM (GRAM) TOPICAL DAILY
COMMUNITY

## 2023-12-04 RX ORDER — SODIUM CHLORIDE 9 MG/ML
INJECTION, SOLUTION INTRAVENOUS CONTINUOUS
Status: DISCONTINUED | OUTPATIENT
Start: 2023-12-04 | End: 2023-12-04 | Stop reason: HOSPADM

## 2023-12-04 RX ADMIN — LIDOCAINE HYDROCHLORIDE 100 MG: 20 INJECTION INTRAVENOUS at 12:12

## 2023-12-04 RX ADMIN — PROPOFOL 10 MG: 10 INJECTION, EMULSION INTRAVENOUS at 12:12

## 2023-12-04 RX ADMIN — PROPOFOL 20 MG: 10 INJECTION, EMULSION INTRAVENOUS at 01:12

## 2023-12-04 RX ADMIN — PROPOFOL 20 MG: 10 INJECTION, EMULSION INTRAVENOUS at 12:12

## 2023-12-04 RX ADMIN — PROPOFOL 80 MG: 10 INJECTION, EMULSION INTRAVENOUS at 12:12

## 2023-12-04 RX ADMIN — SODIUM CHLORIDE: 0.9 INJECTION, SOLUTION INTRAVENOUS at 12:12

## 2023-12-04 NOTE — PROVATION PATIENT INSTRUCTIONS
Discharge Summary/Instructions after an Endoscopic Procedure  Patient Name: Shahrzad Blanc  Patient MRN: [id number]  Patient YOB: 1950 Monday, December 4, 2023  Ro Cagle MD  Dear patient,  As a result of recent federal legislation (The Federal Cures Act), you may   receive lab or pathology results from your procedure in your [facility]   account before your physician is able to contact you. Your physician or   their representative will relay the results to you with their   recommendations at their soonest availability.  Thank you,  RESTRICTIONS:  During your procedure today, you received medications for sedation.  These   medications may affect your judgment, balance and coordination.  Therefore,   for 24 hours, you have the following restrictions:   - DO NOT drive a car, operate machinery, make legal/financial decisions,   sign important papers or drink alcohol.    ACTIVITY:  Today: no heavy lifting, straining or running due to procedural   sedation/anesthesia.  The following day: return to full activity including work.  DIET:  Eat and drink normally unless instructed otherwise.     TREATMENT FOR COMMON SIDE EFFECTS:  - Mild abdominal pain, nausea, belching, bloating or excessive gas:  rest,   eat lightly and use a heating pad.  - Sore Throat: treat with throat lozenges and/or gargle with warm salt   water.  - Because air was used during the procedure, expelling large amounts of air   from your rectum or belching is normal.  - If a bowel prep was taken, you may not have a bowel movement for 1-3 days.    This is normal.  SYMPTOMS TO WATCH FOR AND REPORT TO YOUR PHYSICIAN:  1. Abdominal pain or bloating, other than gas cramps.  2. Chest pain.  3. Back pain.  4. Signs of infection such as: chills or fever occurring within 24 hours   after the procedure.  5. Rectal bleeding, which would show as bright red, maroon, or black stools.   (A tablespoon of blood from the rectum is not serious, especially  if   hemorrhoids are present.)  6. Vomiting.  7. Weakness or dizziness.  GO DIRECTLY TO THE NEAREST EMERGENCY ROOM IF YOU HAVE ANY OF THE FOLLOWING:      Difficulty breathing              Chills and/or fever over 101 F   Persistent vomiting and/or vomiting blood   Severe abdominal pain   Severe chest pain   Black, tarry stools   Bleeding- more than one tablespoon   Any other symptom or condition that you feel may need urgent attention  Your doctor recommends these additional instructions:  If any biopsies were taken, your doctors clinic will contact you in 1 to 2   weeks with any results.  - Discharge patient to home (ambulatory).   - Resume regular diet.   - Continue present medications.   - Await pathology results.   - Repeat colonoscopy in 3 years for surveillance.  For questions, problems or results please call your physician - Ro Cagle MD at Work:  [phone].  [facility], EMERGENCY ROOM PHONE NUMBER: [phone]  IF A COMPLICATION OR EMERGENCY SITUATION ARISES AND YOU ARE UNABLE TO REACH   YOUR PHYSICIAN - GO DIRECTLY TO THE EMERGENCY ROOM.  Ro Cagle MD  12/4/2023 1:15:16 PM  This report has been verified and signed electronically.  Dear patient,  As a result of recent federal legislation (The Federal Cures Act), you may   receive lab or pathology results from your procedure in your [facility]   account before your physician is able to contact you. Your physician or   their representative will relay the results to you with their   recommendations at their soonest availability.  Thank you,  [employer]ATION

## 2023-12-04 NOTE — ANESTHESIA POSTPROCEDURE EVALUATION
Anesthesia Post Evaluation    Patient: Shahrzad Blanc    Procedure(s) Performed: Procedure(s) (LRB):  COLONOSCOPY (N/A)    Final Anesthesia Type: general      Patient location during evaluation: GI PACU  Patient participation: Yes- Able to Participate  Level of consciousness: awake and alert, oriented and awake  Post-procedure vital signs: reviewed and stable  Pain management: adequate  Airway patency: patent    PONV status at discharge: No PONV  Anesthetic complications: no      Cardiovascular status: stable  Respiratory status: unassisted and room air  Hydration status: euvolemic  Follow-up not needed.              Vitals Value Taken Time   /70 12/04/23 1337   Temp 36.1 °C (97 °F) 12/04/23 1314   Pulse 55 12/04/23 1337   Resp 16 12/04/23 1337   SpO2 100 % 12/04/23 1341         Event Time   Out of Recovery 13:29:00         Pain/Nilda Score: Nilda Score: 10 (12/4/2023  1:29 PM)

## 2023-12-04 NOTE — PLAN OF CARE
Pt arrived to recovery dosc via stretcher per ENDO team. Bedside report received. Pt attached to bedside monitor. VSS. Pt sedated post procedure. Pt on room air; oxygen sats 100%. Pt IV access CDI and infusing. Warm blanket applied. Safety maintained..

## 2023-12-04 NOTE — TRANSFER OF CARE
"Anesthesia Transfer of Care Note    Patient: Shahrzad Blanc    Procedure(s) Performed: Procedure(s) (LRB):  COLONOSCOPY (N/A)    Patient location: PACU    Anesthesia Type: general    Transport from OR: Transported from OR on room air with adequate spontaneous ventilation    Post pain: adequate analgesia    Post assessment: no apparent anesthetic complications    Post vital signs: stable    Level of consciousness: sedated    Complications: none    Transfer of care protocol was followed      Last vitals: Visit Vitals  BP (!) 124/57 (BP Location: Left arm, Patient Position: Lying)   Pulse 60   Temp 36.7 °C (98.1 °F) (Temporal)   Resp 14   Ht 5' 8" (1.727 m)   Wt 60.3 kg (133 lb)   SpO2 95%   Breastfeeding No   BMI 20.22 kg/m²     "

## 2023-12-06 LAB
HPV HR 12 DNA SPEC QL NAA+PROBE: NEGATIVE
HPV16 AG SPEC QL: NEGATIVE
HPV18 DNA SPEC QL NAA+PROBE: NEGATIVE

## 2023-12-10 LAB
FINAL PATHOLOGIC DIAGNOSIS: NORMAL
GROSS: NORMAL
Lab: NORMAL
MICROSCOPIC EXAM: NORMAL

## 2023-12-11 ENCOUNTER — HOSPITAL ENCOUNTER (OUTPATIENT)
Dept: RADIOLOGY | Facility: OTHER | Age: 73
Discharge: HOME | End: 2023-12-11
Attending: PHYSICIAN ASSISTANT
Payer: MEDICARE

## 2023-12-11 DIAGNOSIS — Z78.0 POSTMENOPAUSAL STATUS: ICD-10-CM

## 2023-12-11 LAB
FINAL PATHOLOGIC DIAGNOSIS: NORMAL
Lab: NORMAL

## 2023-12-11 PROCEDURE — 77080 DXA BONE DENSITY AXIAL: CPT | Mod: TC

## 2023-12-11 PROCEDURE — 77080 DXA BONE DENSITY AXIAL SKELETON 1 OR MORE SITES: ICD-10-PCS | Mod: 26,,, | Performed by: RADIOLOGY

## 2023-12-11 PROCEDURE — 77080 DXA BONE DENSITY AXIAL: CPT | Mod: 26,,, | Performed by: RADIOLOGY

## 2023-12-13 ENCOUNTER — LAB VISIT (OUTPATIENT)
Dept: LAB | Facility: OTHER | Age: 73
End: 2023-12-13
Attending: INTERNAL MEDICINE
Payer: MEDICARE

## 2023-12-13 DIAGNOSIS — D64.9 ANEMIA: ICD-10-CM

## 2023-12-13 DIAGNOSIS — Z13.220 SCREENING FOR HYPERCHOLESTEROLEMIA: ICD-10-CM

## 2023-12-13 DIAGNOSIS — R73.03 PREDIABETES: ICD-10-CM

## 2023-12-13 DIAGNOSIS — E78.49: ICD-10-CM

## 2023-12-13 DIAGNOSIS — E78.49 HYPERALPHALIPOPROTEINEMIA: ICD-10-CM

## 2023-12-13 DIAGNOSIS — R73.03 DIABETES MELLITUS, LATENT: Primary | ICD-10-CM

## 2023-12-13 DIAGNOSIS — Z13.228 SCREENING FOR METABOLIC DISORDER: ICD-10-CM

## 2023-12-13 LAB
ALBUMIN SERPL BCP-MCNC: 3.8 G/DL (ref 3.5–5.2)
ALP SERPL-CCNC: 69 U/L (ref 55–135)
ALT SERPL W/O P-5'-P-CCNC: 25 U/L (ref 10–44)
ANION GAP SERPL CALC-SCNC: 8 MMOL/L (ref 8–16)
AST SERPL-CCNC: 27 U/L (ref 10–40)
BASOPHILS # BLD AUTO: 0.04 K/UL (ref 0–0.2)
BASOPHILS NFR BLD: 1 % (ref 0–1.9)
BILIRUB SERPL-MCNC: 0.7 MG/DL (ref 0.1–1)
BUN SERPL-MCNC: 20 MG/DL (ref 8–23)
CALCIUM SERPL-MCNC: 9.1 MG/DL (ref 8.7–10.5)
CHLORIDE SERPL-SCNC: 104 MMOL/L (ref 95–110)
CHOLEST SERPL-MCNC: 162 MG/DL (ref 120–199)
CHOLEST/HDLC SERPL: 2 {RATIO} (ref 2–5)
CO2 SERPL-SCNC: 28 MMOL/L (ref 23–29)
CREAT SERPL-MCNC: 0.8 MG/DL (ref 0.5–1.4)
DIFFERENTIAL METHOD: ABNORMAL
EOSINOPHIL # BLD AUTO: 0.1 K/UL (ref 0–0.5)
EOSINOPHIL NFR BLD: 2.3 % (ref 0–8)
ERYTHROCYTE [DISTWIDTH] IN BLOOD BY AUTOMATED COUNT: 12.6 % (ref 11.5–14.5)
EST. GFR  (NO RACE VARIABLE): >60 ML/MIN/1.73 M^2
ESTIMATED AVG GLUCOSE: 111 MG/DL (ref 68–131)
GLUCOSE SERPL-MCNC: 97 MG/DL (ref 70–110)
HBA1C MFR BLD: 5.5 % (ref 4–5.6)
HCT VFR BLD AUTO: 43.9 % (ref 37–48.5)
HDLC SERPL-MCNC: 80 MG/DL (ref 40–75)
HDLC SERPL: 49.4 % (ref 20–50)
HGB BLD-MCNC: 14.6 G/DL (ref 12–16)
IMM GRANULOCYTES # BLD AUTO: 0.01 K/UL (ref 0–0.04)
IMM GRANULOCYTES NFR BLD AUTO: 0.3 % (ref 0–0.5)
LDLC SERPL CALC-MCNC: 75.2 MG/DL (ref 63–159)
LYMPHOCYTES # BLD AUTO: 1.5 K/UL (ref 1–4.8)
LYMPHOCYTES NFR BLD: 37.9 % (ref 18–48)
MCH RBC QN AUTO: 31.5 PG (ref 27–31)
MCHC RBC AUTO-ENTMCNC: 33.3 G/DL (ref 32–36)
MCV RBC AUTO: 95 FL (ref 82–98)
MONOCYTES # BLD AUTO: 0.3 K/UL (ref 0.3–1)
MONOCYTES NFR BLD: 7.5 % (ref 4–15)
NEUTROPHILS # BLD AUTO: 2 K/UL (ref 1.8–7.7)
NEUTROPHILS NFR BLD: 51 % (ref 38–73)
NONHDLC SERPL-MCNC: 82 MG/DL
NRBC BLD-RTO: 0 /100 WBC
PLATELET # BLD AUTO: 246 K/UL (ref 150–450)
PMV BLD AUTO: 9.6 FL (ref 9.2–12.9)
POTASSIUM SERPL-SCNC: 4.7 MMOL/L (ref 3.5–5.1)
PROT SERPL-MCNC: 6.9 G/DL (ref 6–8.4)
RBC # BLD AUTO: 4.64 M/UL (ref 4–5.4)
SODIUM SERPL-SCNC: 140 MMOL/L (ref 136–145)
TRIGL SERPL-MCNC: 34 MG/DL (ref 30–150)
WBC # BLD AUTO: 3.88 K/UL (ref 3.9–12.7)

## 2023-12-13 PROCEDURE — 85025 COMPLETE CBC W/AUTO DIFF WBC: CPT | Performed by: INTERNAL MEDICINE

## 2023-12-13 PROCEDURE — 80053 COMPREHEN METABOLIC PANEL: CPT | Performed by: INTERNAL MEDICINE

## 2023-12-13 PROCEDURE — 83695 ASSAY OF LIPOPROTEIN(A): CPT | Performed by: INTERNAL MEDICINE

## 2023-12-13 PROCEDURE — 83036 HEMOGLOBIN GLYCOSYLATED A1C: CPT | Performed by: INTERNAL MEDICINE

## 2023-12-13 PROCEDURE — 80061 LIPID PANEL: CPT | Performed by: INTERNAL MEDICINE

## 2023-12-17 LAB — LDLC SERPL-MCNC: 78 MG/DL

## 2023-12-19 LAB — LPA SERPL-MCNC: 12 MG/DL (ref 0–30)

## 2024-12-04 ENCOUNTER — HOSPITAL ENCOUNTER (OUTPATIENT)
Dept: RADIOLOGY | Facility: OTHER | Age: 74
Discharge: HOME | End: 2024-12-04
Payer: MEDICARE

## 2024-12-04 DIAGNOSIS — Z12.31 ENCOUNTER FOR SCREENING MAMMOGRAM FOR BREAST CANCER: ICD-10-CM

## 2024-12-04 PROCEDURE — 77067 SCR MAMMO BI INCL CAD: CPT | Mod: 26,,, | Performed by: RADIOLOGY

## 2024-12-04 PROCEDURE — 77063 BREAST TOMOSYNTHESIS BI: CPT | Mod: 26,,, | Performed by: RADIOLOGY

## 2024-12-04 PROCEDURE — 77063 BREAST TOMOSYNTHESIS BI: CPT | Mod: TC

## 2024-12-12 ENCOUNTER — LAB VISIT (OUTPATIENT)
Dept: LAB | Facility: OTHER | Age: 74
End: 2024-12-12
Attending: FAMILY MEDICINE
Payer: MEDICARE

## 2024-12-12 ENCOUNTER — OFFICE VISIT (OUTPATIENT)
Dept: INTERNAL MEDICINE | Facility: CLINIC | Age: 74
End: 2024-12-12
Payer: MEDICARE

## 2024-12-12 VITALS
SYSTOLIC BLOOD PRESSURE: 100 MMHG | OXYGEN SATURATION: 98 % | HEIGHT: 68 IN | DIASTOLIC BLOOD PRESSURE: 72 MMHG | WEIGHT: 139.31 LBS | BODY MASS INDEX: 21.11 KG/M2 | HEART RATE: 64 BPM

## 2024-12-12 DIAGNOSIS — Z00.00 ANNUAL PHYSICAL EXAM: ICD-10-CM

## 2024-12-12 DIAGNOSIS — Z00.00 ANNUAL PHYSICAL EXAM: Primary | ICD-10-CM

## 2024-12-12 DIAGNOSIS — L90.8 SKIN AGING: ICD-10-CM

## 2024-12-12 DIAGNOSIS — E78.2 MIXED HYPERLIPIDEMIA: ICD-10-CM

## 2024-12-12 DIAGNOSIS — M85.872 OTHER SPECIFIED DISORDERS OF BONE DENSITY AND STRUCTURE, LEFT ANKLE AND FOOT: ICD-10-CM

## 2024-12-12 DIAGNOSIS — M85.80 OSTEOPENIA, UNSPECIFIED LOCATION: ICD-10-CM

## 2024-12-12 DIAGNOSIS — R73.01 IMPAIRED FASTING GLUCOSE: ICD-10-CM

## 2024-12-12 DIAGNOSIS — Z12.31 ENCOUNTER FOR SCREENING MAMMOGRAM FOR MALIGNANT NEOPLASM OF BREAST: ICD-10-CM

## 2024-12-12 DIAGNOSIS — H91.90 DECREASED HEARING, UNSPECIFIED LATERALITY: ICD-10-CM

## 2024-12-12 LAB
ALBUMIN SERPL BCP-MCNC: 4.2 G/DL (ref 3.5–5.2)
ALP SERPL-CCNC: 78 U/L (ref 40–150)
ALT SERPL W/O P-5'-P-CCNC: 25 U/L (ref 10–44)
ANION GAP SERPL CALC-SCNC: 8 MMOL/L (ref 8–16)
AST SERPL-CCNC: 30 U/L (ref 10–40)
BASOPHILS # BLD AUTO: 0.06 K/UL (ref 0–0.2)
BASOPHILS NFR BLD: 0.8 % (ref 0–1.9)
BILIRUB SERPL-MCNC: 0.5 MG/DL (ref 0.1–1)
BUN SERPL-MCNC: 13 MG/DL (ref 8–23)
CALCIUM SERPL-MCNC: 9.9 MG/DL (ref 8.7–10.5)
CHLORIDE SERPL-SCNC: 106 MMOL/L (ref 95–110)
CHOLEST SERPL-MCNC: 172 MG/DL (ref 120–199)
CHOLEST/HDLC SERPL: 2.1 {RATIO} (ref 2–5)
CO2 SERPL-SCNC: 28 MMOL/L (ref 23–29)
CREAT SERPL-MCNC: 0.8 MG/DL (ref 0.5–1.4)
CRP SERPL-MCNC: 0.4 MG/L (ref 0–8.2)
DIFFERENTIAL METHOD BLD: ABNORMAL
EOSINOPHIL # BLD AUTO: 0.2 K/UL (ref 0–0.5)
EOSINOPHIL NFR BLD: 2.7 % (ref 0–8)
ERYTHROCYTE [DISTWIDTH] IN BLOOD BY AUTOMATED COUNT: 12.3 % (ref 11.5–14.5)
EST. GFR  (NO RACE VARIABLE): >60 ML/MIN/1.73 M^2
ESTIMATED AVG GLUCOSE: 111 MG/DL (ref 68–131)
GLUCOSE SERPL-MCNC: 100 MG/DL (ref 70–110)
HBA1C MFR BLD: 5.5 % (ref 4–5.6)
HCT VFR BLD AUTO: 46.8 % (ref 37–48.5)
HDLC SERPL-MCNC: 82 MG/DL (ref 40–75)
HDLC SERPL: 47.7 % (ref 20–50)
HGB BLD-MCNC: 15.2 G/DL (ref 12–16)
IMM GRANULOCYTES # BLD AUTO: 0.01 K/UL (ref 0–0.04)
IMM GRANULOCYTES NFR BLD AUTO: 0.1 % (ref 0–0.5)
LDLC SERPL CALC-MCNC: 78.8 MG/DL (ref 63–159)
LYMPHOCYTES # BLD AUTO: 1.8 K/UL (ref 1–4.8)
LYMPHOCYTES NFR BLD: 25.7 % (ref 18–48)
MCH RBC QN AUTO: 31.2 PG (ref 27–31)
MCHC RBC AUTO-ENTMCNC: 32.5 G/DL (ref 32–36)
MCV RBC AUTO: 96 FL (ref 82–98)
MONOCYTES # BLD AUTO: 0.5 K/UL (ref 0.3–1)
MONOCYTES NFR BLD: 6.5 % (ref 4–15)
NEUTROPHILS # BLD AUTO: 4.6 K/UL (ref 1.8–7.7)
NEUTROPHILS NFR BLD: 64.2 % (ref 38–73)
NONHDLC SERPL-MCNC: 90 MG/DL
NRBC BLD-RTO: 0 /100 WBC
PLATELET # BLD AUTO: 309 K/UL (ref 150–450)
PMV BLD AUTO: 9.4 FL (ref 9.2–12.9)
POTASSIUM SERPL-SCNC: 4.3 MMOL/L (ref 3.5–5.1)
PROT SERPL-MCNC: 7.2 G/DL (ref 6–8.4)
RBC # BLD AUTO: 4.87 M/UL (ref 4–5.4)
SODIUM SERPL-SCNC: 142 MMOL/L (ref 136–145)
T4 FREE SERPL-MCNC: 0.88 NG/DL (ref 0.71–1.51)
TRIGL SERPL-MCNC: 56 MG/DL (ref 30–150)
TSH SERPL DL<=0.005 MIU/L-ACNC: 1.97 UIU/ML (ref 0.4–4)
WBC # BLD AUTO: 7.11 K/UL (ref 3.9–12.7)

## 2024-12-12 PROCEDURE — 83036 HEMOGLOBIN GLYCOSYLATED A1C: CPT | Performed by: FAMILY MEDICINE

## 2024-12-12 PROCEDURE — 84439 ASSAY OF FREE THYROXINE: CPT | Performed by: FAMILY MEDICINE

## 2024-12-12 PROCEDURE — 36415 COLL VENOUS BLD VENIPUNCTURE: CPT | Performed by: FAMILY MEDICINE

## 2024-12-12 PROCEDURE — 80053 COMPREHEN METABOLIC PANEL: CPT | Performed by: FAMILY MEDICINE

## 2024-12-12 PROCEDURE — 84443 ASSAY THYROID STIM HORMONE: CPT | Performed by: FAMILY MEDICINE

## 2024-12-12 PROCEDURE — 86140 C-REACTIVE PROTEIN: CPT | Performed by: FAMILY MEDICINE

## 2024-12-12 PROCEDURE — 80061 LIPID PANEL: CPT | Performed by: FAMILY MEDICINE

## 2024-12-12 PROCEDURE — 99999 PR PBB SHADOW E&M-EST. PATIENT-LVL V: CPT | Mod: PBBFAC,,, | Performed by: FAMILY MEDICINE

## 2024-12-12 PROCEDURE — 85025 COMPLETE CBC W/AUTO DIFF WBC: CPT | Performed by: FAMILY MEDICINE

## 2024-12-12 PROCEDURE — 99215 OFFICE O/P EST HI 40 MIN: CPT | Mod: PBBFAC | Performed by: FAMILY MEDICINE

## 2024-12-12 NOTE — PROGRESS NOTES
Subjective:      Patient ID: Shahrzad Blanc is a 73 y.o. female.    Chief Complaint: Annual Exam and Establish Care    History of Present Illness    CHIEF COMPLAINT:  - Ms. Blanc presents for an annual wellness visit and to establish care with a new primary care physician after moving to the area.    HPI:  Ms. Blanc, a 72-year-old female, relocated to [place] from [place] in 2020. She has been managing her healthcare with her , a cardiologist, but determined it was necessary to establish care with her own physician. Ms. Blanc reports a history of osteopenia, diagnosed approximately 1 year ago. This diagnosis has raised concerns due to the increased risk of falls and fractures. In response, she has made efforts to increase her calcium intake through diet and has incorporated walking and light weightlifting into her routine. She expresses interest in reassessing her bone density to evaluate the impact of these lifestyle changes.    Ms. Blanc reports that her blood sugar have been at the upper limit of normal, which she wishes to monitor closely due to a family history of type 2 diabetes. Her father was diagnosed with diabetes at age 60.    Ms. Blanc reports vaginal dryness during intercourse, which she manages with a lubricating gel.     She has noticed a change in her bowel habits over the past 6 months, with her stools becoming consistently soft, though not watery or painful. This represents a shift from her previous tendency towards constipation.    Ms. Blanc has noticed possible hearing decline. She sometimes needs to ask for repetition, particularly in noisy environments or when she cannot see the speaker's face.    Ms. Blanc denies chest pain, trouble breathing, abdominal pain, skin concerns such as changing moles or rashes, and urinary problems. She denies any history of heart issues, diabetes, breast cancer, or colon cancer.    MEDICATIONS:  - Ezetimibe simvastatin combo, for  cholesterol, taken for about 20 years  - Calcium supplement, 2 tablets daily  - Vitamin D supplement    MEDICAL HISTORY:  - Osteopenia  - Hyperlipidemia  - Diverticulosis  - Menopause: Yes  - Hysterectomy: No  - Last Pap: 1 year ago, normal  - Sexually active: Yes  - Children: 3 grandchildren mentioned  - HRT: Current calcium and vitamin D supplements  - Flu shot received recently  - COVID-19 vaccine received recently  - RSV vaccine received last year  - Shingles vaccine received in the past    SURGICAL HISTORY:  - Cataract surgery  - Colonoscopy: 2023, 4 polyps removed (benign)    FAMILY HISTORY:  - Father: Type 2 diabetes (diagnosed at age 60), heart disease  - Father's siblings: Two had Alzheimer's (aunt and uncle)  - Mother: Bladder issues requiring catheterization (related to hysterectomy and scar tissue)  - Sister: Melanoma at age 36 (caught early)  - Sister: Diverticulitis    SOCIAL HISTORY:  - Alcohol Use: Consumes wine on weekends, typically no more than 2 glasses at any given time  - Smoking: Never  - Illicit drugs: Never  - Occupation: Consultant, former educator and head of school in [employer]  - Marital status: , 51st anniversary approaching next week    IMAGING:  - Mammogram: Dec 2024, BI-RADS 1  - Bone density scan: Dec 2023, osteopenia         Patient Active Problem List   Diagnosis    Hyperlipemia          Current Outpatient Medications:     calcium citrate-vitamin D3 315-200 mg (CITRACAL+D) 315 mg-5 mcg (200 unit) per tablet, Take 2 tablets by mouth once daily., Disp: , Rfl:     ezetimibe-simvastatin 10-20 mg (VYTORIN) 10-20 mg per tablet, , Disp: , Rfl:     multivitamin (ONE DAILY MULTIVITAMIN) per tablet, Take 1 tablet by mouth once daily., Disp: , Rfl:     Past Surgical History:   Procedure Laterality Date    CATARACT EXTRACTION W/ INTRAOCULAR LENS  IMPLANT, BILATERAL      COLONOSCOPY N/A 12/4/2023    Procedure: COLONOSCOPY;  Surgeon: Ro Cagle MD;  Location: [facility]  ENDOSCOPY;  Service: Endoscopy;  Laterality: N/A;  instructions to pt portal  11/28- pt states that she has suprep, instr reviewed and sent to portal, pre call complete.  DBM        Family History   Problem Relation Name Age of Onset    Other (bladder issues) Mother      Heart disease Father      Diabetes Father      Arthritis Sister      Diverticulitis Sister      No Known Problems Daughter      No Known Problems Son      Alzheimer's disease Paternal Aunt      Alzheimer's disease Paternal Uncle          Social History     Socioeconomic History    Marital status:    Occupational History    Occupation: educator   Tobacco Use    Smoking status: Never    Smokeless tobacco: Never   Substance and Sexual Activity    Alcohol use: Yes     Alcohol/week: 2.0 standard drinks of alcohol     Types: 2 Glasses of wine per week     Comment: 1-2 days a week    Drug use: Never    Sexual activity: Yes     Partners: Male     Social Drivers of Health     Financial Resource Strain: Low Risk  (12/5/2024)    Overall Financial Resource Strain (CARDIA)     Difficulty of Paying Living Expenses: Not hard at all   Food Insecurity: No Food Insecurity (12/5/2024)    Hunger Vital Sign     Worried About Running Out of Food in the Last Year: Never true     Ran Out of Food in the Last Year: Never true   Transportation Needs: No Transportation Needs (4/18/2023)    PRAPARE - Transportation     Lack of Transportation (Medical): No     Lack of Transportation (Non-Medical): No   Physical Activity: Sufficiently Active (12/5/2024)    Exercise Vital Sign     Days of Exercise per Week: 7 days     Minutes of Exercise per Session: 70 min   Stress: No Stress Concern Present (12/5/2024)    [employer] - Occupational Stress Questionnaire     Feeling of Stress : Only a little   Housing Stability: Low Risk  (4/18/2023)    Housing Stability Vital Sign     Unable to Pay for Housing in the Last Year: No     Number of Places Lived in  "the Last Year: 1     Unstable Housing in the Last Year: No        Lab Results   Component Value Date     12/13/2023    K 4.7 12/13/2023     12/13/2023    CO2 28 12/13/2023    GLU 97 12/13/2023    BUN 20 12/13/2023    CREATININE 0.8 12/13/2023    CALCIUM 9.1 12/13/2023    PROT 6.9 12/13/2023    ALBUMIN 3.8 12/13/2023    BILITOT 0.7 12/13/2023    ALKPHOS 69 12/13/2023    AST 27 12/13/2023    ALT 25 12/13/2023    ANIONGAP 8 12/13/2023    ESTGFRAFRICA >60 06/08/2022    EGFRNONAA >60 06/08/2022    WBC 3.88 (L) 12/13/2023    HGB 14.6 12/13/2023    HGB 14.1 06/08/2022    HCT 43.9 12/13/2023    MCV 95 12/13/2023     12/13/2023       Lab Results   Component Value Date    HGBA1C 5.5 12/13/2023    HGBA1C 5.4 06/08/2022    HGBA1C 5.6 05/14/2021     No results found for: "MICALBCREAT"  Lab Results   Component Value Date    LDLCALC 75.2 12/13/2023    LDLCALC 70.8 06/08/2022    CHOL 162 12/13/2023    HDL 80 (H) 12/13/2023    TRIG 34 12/13/2023       Review of Systems   Constitutional:  Negative for appetite change, chills, fever and unexpected weight change.   HENT:  Negative for ear pain, sinus pressure/congestion and sore throat.    Eyes:  Negative for visual disturbance.   Respiratory:  Negative for shortness of breath and wheezing.    Cardiovascular:  Negative for chest pain, palpitations and leg swelling.   Gastrointestinal:  Negative for abdominal pain, blood in stool, change in bowel habit, constipation, diarrhea and vomiting.   Genitourinary:  Negative for dysuria and hematuria.   Musculoskeletal:  Negative for arthralgias.   Integumentary:  Negative for rash.   Neurological:  Negative for dizziness and headaches.   Psychiatric/Behavioral:  Negative for depressed mood and sleep disturbance.         Vitals:    12/12/24 0924   BP: 100/72   Pulse: 64   SpO2: 98%   Weight: 63.2 kg (139 lb 5.3 oz)   Height: 5' 8" (1.727 m)   PainSc:   2   PainLoc: Foot     Objective:   Physical Exam    General: Pleasant. No " acute distress. Well-developed. Well-nourished.  Eyes: EOMBI. Sclerae anicteric. PERRLA.  HENT: Normocephalic. Atraumatic. Nares patent. Moist oral mucosa.  Ears: Bilateral TMs clear. Normal ear canals and eardrums.  Cardiovascular: Regular rate and rhythm. No murmurs. No gallops. No rubs. Normal S1 and S2. Normal pulses.  Respiratory: Normal respiratory effort. Clear to auscultation bilaterally. No rales. No rhonchi. No wheezing.  Abdomen: Soft. Nontender. Nondistended. Normoactive bowel sounds.  Musculoskeletal: No obvious deformity. Normal range of motion.  Extremities: No lower extremity edema.  Neurological: Alert and lucid. Normal gait. No gross deficits.  Psychiatric: Normal mood. Normal affect. Good insight. Good judgment.  Skin: Warm. Intact. No rash. No wound.  Mouth: Normal oropharynx.  Neck: All normal.        Assessment/Plan       ICD-10-CM ICD-9-CM   1. Annual physical exam  Z00.00 V70.0   2. Encounter for screening mammogram for malignant neoplasm of breast  Z12.31 V76.12   3. Osteopenia, unspecified location  M85.80 733.90   4. Mixed hyperlipidemia  E78.2 272.2   5. Other specified disorders of bone density and structure, left ankle and foot  M85.872 733.99   6. Impaired fasting glucose  R73.01 790.21   7. Decreased hearing, unspecified laterality  H91.90 389.9   8. Skin aging  L90.8 701.8        Assessment & Plan     Evaluated patient's current cholesterol management with ezetimibe simvastatin combo   Assessed osteopenia status and recommended calcium and vitamin D supplementation   Considered family history of diabetes and Alzheimer's   Reviewed recent colonoscopy results showing diverticulosis and benign polyps   Evaluated vaginal dryness, recommending continued use of lubricant gel   Assessed recent changes in stool consistency, recommending increased fiber intake    GENERAL ADULT MEDICAL EXAMINATION:  - Ordered comprehensive metabolic panel, fasting blood sugar, hemoglobin A1C, lipid panel with  direct LDL, and CBC with differential.  - Discussed pneumonia vaccine (Pneumovax 20).  - Referred to Dermatology for skin check    HYPERCHOLESTEROLEMIA:  - Continued ezetimibe simvastatin combo for cholesterol management.    BONE DENSITY AND STRUCTURE DISORDERS:  - Ordered DXA bone density scan.  - Continued calcium supplementation: 1200 mg daily, split into two 600 mg doses.  - Continued vitamin D supplementation: 800-1000 units daily.    DIVERTICULOSIS:  - Explained diverticulosis vs. diverticulitis and current recommendations regarding diet.  - Discussed the role of fiber in regulating stool consistency.  - Ms. Blanc to increase fiber intake through diet or supplements like Metamucil.    MENOPAUSAL AND FEMALE CLIMACTERIC STATES:  - Ms. Blanc to maintain current sexual activity with use of lubricant gel.  - Ms. Blanc to perform Kegel exercises for pelvic floor strength.  - Clarified cervical cancer screening guidelines for patients over 65.    HEARING LOSS:  - Referred to Ear, Nose, and Throat (ENT) for hearing evaluation.  - Referred to Audiology for hearing evaluation.    DIETARY COUNSELING:  - Ms. Blanc to increase fiber intake through diet or supplements like Metamucil.       Annual physical exam  -     Lipid Panel; Future  -     Comprehensive Metabolic Panel; Future  -     CBC Auto Differential; Future  -     TSH; Future  -     T4, Free; Future  -     HEMOGLOBIN A1C; Future; Expected date: 12/12/2024  -     LDL Cholesterol, Direct Measurement; Future; Expected date: 12/12/2024  -     C-REACTIVE PROTEIN; Future; Expected date: 12/12/2024    Encounter for screening mammogram for malignant neoplasm of breast    Osteopenia, unspecified location  -     DXA Bone Density Axial Skeleton 1 or more sites; Future; Expected date: 12/12/2024    Mixed hyperlipidemia  -     Lipid Panel; Future  -     Comprehensive Metabolic Panel; Future  -     CBC Auto Differential; Future  -     TSH; Future  -     T4, Free; Future  -      LDL Cholesterol, Direct Measurement; Future; Expected date: 12/12/2024    Other specified disorders of bone density and structure, left ankle and foot  -     DXA Bone Density Axial Skeleton 1 or more sites; Future; Expected date: 12/12/2024    Impaired fasting glucose  -     HEMOGLOBIN A1C; Future; Expected date: 12/12/2024    Decreased hearing, unspecified laterality  -     Ambulatory referral/consult to ENT; Future; Expected date: 12/19/2024  -     Ambulatory referral/consult to Audiology; Future; Expected date: 12/19/2024    Skin aging  -     Ambulatory referral/consult to Dermatology; Future; Expected date: 12/19/2024       Chronic conditions status updated as per HPI.  Other than changes above, cont current medications and maintain follow up with specialists.      Follow up in about 1 year (around 12/12/2025) for annual physical.      Raisa Alonzo MD  [facility]    This note was generated with the assistance of ambient listening technology. Verbal consent was obtained by the patient and accompanying visitor(s) for the recording of patient appointment to facilitate this note. I attest to having reviewed and edited the generated note for accuracy, though some syntax or spelling errors may persist. Please contact the author of this note for any clarification.       Patient Instructions   CALCIUM 1200MG PER DAY (DIVIDED DOSES ABSORBED BETTER)  VITAMIN D 800-1000 IU PER DAY  All of your core healthy metrics are met.      Immunization History   Administered Date(s) Administered    COVID-19, MRNA, LN-S, PF (MODERNA FULL 0.5 ML DOSE) 01/28/2021, 03/03/2021    COVID-19, mRNA, LNP-S, PF (Moderna) Ages 12+ 03/11/2024    COVID-19, mRNA, LNP-S, bivalent booster, PF (Moderna Omicron)12 + YEARS 04/21/2023    Influenza - Quadrivalent - High Dose - PF (65 years and older) 09/14/2020, 10/10/2021    Influenza - Trivalent - Fluzone High Dose - PF (65 years and older) 11/22/2019, 10/09/2022    Zoster  Recombinant 12/19/2020, 05/16/2021

## 2024-12-15 LAB — LDLC SERPL-MCNC: 72 MG/DL

## 2024-12-16 ENCOUNTER — HOSPITAL ENCOUNTER (OUTPATIENT)
Dept: RADIOLOGY | Facility: OTHER | Age: 74
Discharge: HOME | End: 2024-12-16
Attending: FAMILY MEDICINE
Payer: MEDICARE

## 2024-12-16 DIAGNOSIS — M85.80 OSTEOPENIA, UNSPECIFIED LOCATION: ICD-10-CM

## 2024-12-16 DIAGNOSIS — M85.872 OTHER SPECIFIED DISORDERS OF BONE DENSITY AND STRUCTURE, LEFT ANKLE AND FOOT: ICD-10-CM

## 2024-12-16 PROCEDURE — 77080 DXA BONE DENSITY AXIAL: CPT | Mod: TC

## 2024-12-16 PROCEDURE — 77080 DXA BONE DENSITY AXIAL: CPT | Mod: 26,,, | Performed by: RADIOLOGY

## 2025-01-09 ENCOUNTER — PATIENT MESSAGE (OUTPATIENT)
Dept: INTERNAL MEDICINE | Facility: CLINIC | Age: 75
End: 2025-01-09
Payer: MEDICARE

## 2025-01-09 DIAGNOSIS — M85.80 OSTEOPENIA, UNSPECIFIED LOCATION: Primary | ICD-10-CM

## 2025-01-09 DIAGNOSIS — M85.872 OTHER SPECIFIED DISORDERS OF BONE DENSITY AND STRUCTURE, LEFT ANKLE AND FOOT: Primary | ICD-10-CM

## 2025-01-09 DIAGNOSIS — R41.3 MEMORY CHANGES: ICD-10-CM

## 2025-01-09 DIAGNOSIS — R79.89 LOW VITAMIN D LEVEL: ICD-10-CM

## 2025-01-10 ENCOUNTER — LAB VISIT (OUTPATIENT)
Dept: LAB | Facility: OTHER | Age: 75
End: 2025-01-10
Attending: FAMILY MEDICINE
Payer: MEDICARE

## 2025-01-10 DIAGNOSIS — R79.89 LOW VITAMIN D LEVEL: ICD-10-CM

## 2025-01-10 DIAGNOSIS — M85.80 OSTEOPENIA, UNSPECIFIED LOCATION: ICD-10-CM

## 2025-01-10 PROCEDURE — 82306 VITAMIN D 25 HYDROXY: CPT | Performed by: FAMILY MEDICINE

## 2025-01-10 PROCEDURE — 36415 COLL VENOUS BLD VENIPUNCTURE: CPT | Performed by: FAMILY MEDICINE

## 2025-01-13 LAB — 25(OH)D3+25(OH)D2 SERPL-MCNC: 71 NG/ML (ref 30–96)

## 2025-01-16 ENCOUNTER — TELEPHONE (OUTPATIENT)
Dept: DERMATOLOGY | Facility: CLINIC | Age: 75
End: 2025-01-16
Payer: MEDICARE

## 2025-01-27 ENCOUNTER — OFFICE VISIT (OUTPATIENT)
Dept: OTOLARYNGOLOGY | Facility: CLINIC | Age: 75
End: 2025-01-27
Payer: MEDICARE

## 2025-01-27 ENCOUNTER — CLINICAL SUPPORT (OUTPATIENT)
Dept: OTOLARYNGOLOGY | Facility: CLINIC | Age: 75
End: 2025-01-27
Payer: MEDICARE

## 2025-01-27 VITALS
HEIGHT: 68 IN | HEART RATE: 66 BPM | SYSTOLIC BLOOD PRESSURE: 103 MMHG | DIASTOLIC BLOOD PRESSURE: 69 MMHG | WEIGHT: 139.31 LBS | BODY MASS INDEX: 21.11 KG/M2

## 2025-01-27 DIAGNOSIS — H90.3 SENSORINEURAL HEARING LOSS (SNHL) OF BOTH EARS: Primary | ICD-10-CM

## 2025-01-27 DIAGNOSIS — H91.90 DECREASED HEARING, UNSPECIFIED LATERALITY: ICD-10-CM

## 2025-01-27 DIAGNOSIS — R73.01 IMPAIRED FASTING GLUCOSE: Primary | ICD-10-CM

## 2025-01-27 PROCEDURE — 99204 OFFICE O/P NEW MOD 45 MIN: CPT | Mod: S$GLB,,, | Performed by: STUDENT IN AN ORGANIZED HEALTH CARE EDUCATION/TRAINING PROGRAM

## 2025-01-27 PROCEDURE — 92567 TYMPANOMETRY: CPT | Mod: S$GLB,,,

## 2025-01-27 PROCEDURE — 92557 COMPREHENSIVE HEARING TEST: CPT | Mod: S$GLB,,,

## 2025-01-27 NOTE — PROGRESS NOTES
Ear, Nose, & Throat  Otolaryngology - Head & Neck Surgery    Summary of Visit:  Shahrzad Blanc is a kind patient who was referred to me by Dr. Raisa Alonzo in consultation for Hearing Loss  As I discussed with the her, her audiogram shows evidence of mild-moderate high frequency SNHL. I gave her the information for Vianey Weathers () our hearing aid coordinator. We also discussed repeating her audiogram in 1 year. Thank you for this consult. Please feel free to reach out to me or my office with any concerns if needed.     Trey De Souza MD  [address], [address]  [place] [address]  P: [phone]  F: [phone]  Subjective:     Chief Complaint:   Chief Complaint   Patient presents with    Hearing Loss       Shahrzad Blanc is a 74 y.o. female who was referred to me by Dr. Raisa Alonzo in consultation for chronic, progressive hearing loss. She first noticed a change in his hearing several years ago.    She feels her hearing struggles the most when her  is in other rooms or she cannot see the person speaking.     Associated symptoms include  none . She denies any vertigo, aural fullness, otalgia, or otorrhea.     Otologic history is significant for none    She does not wear hearing aids but is interested.     She is not using ototoxic medications.     Past Medical History  Active Ambulatory Problems     Diagnosis Date Noted    Hyperlipemia      Resolved Ambulatory Problems     Diagnosis Date Noted    No Resolved Ambulatory Problems     No Additional Past Medical History       Past Surgical History  She has a past surgical history that includes Cataract extraction w/ intraocular lens  implant, bilateral and Colonoscopy (N/A, 12/4/2023).    Past Surgical History:   Procedure Laterality Date    CATARACT EXTRACTION W/ INTRAOCULAR LENS  IMPLANT, BILATERAL      COLONOSCOPY N/A 12/4/2023    Procedure: COLONOSCOPY;  Surgeon: Ro Cagle MD;  [facility] ENDOSCOPY;  Service: Endoscopy;   "Laterality: N/A;  instructions to pt portal  11/28- pt states that she has suprep, instr reviewed and sent to portal, pre call complete.  DBM        Family History  Her family history includes Alzheimer's disease in her paternal aunt and paternal uncle; Arthritis in her sister; Diabetes in her father; Diverticulitis in her sister; Heart disease in her father; No Known Problems in her daughter and son; bladder issues in her mother.    Social History  She reports that she has never smoked. She has never used smokeless tobacco. She reports current alcohol use of about 2.0 standard drinks of alcohol per week. She reports that she does not use drugs.    Allergies  She has No Known Allergies.    Medications  She has a current medication list which includes the following prescription(s): calcium citrate-vitamin d3 315-200 mg, ezetimibe-simvastatin 10-20 mg, and multivitamin.    ROS:  Pertinent positive and negative review of systems as noted in HPI.      Objective:     /69 (BP Location: Left arm, Patient Position: Sitting)   Pulse 66   Ht 5' 8" (1.727 m)   Wt 63.2 kg (139 lb 5.3 oz)   BMI 21.19 kg/m²    Constitutional: Well appearing, communicating. No acute distress  Voice: Euphonic  Eyes: Conjunctiva WNL, Pupils reactive  Head/Face: House Brackmann I Bilaterally.  Right Ear:    Auricle normally developed   EAC: normal   Tympanic membrane: intact   Middle Ear: No effusion present and O[place]icles in normal position  Left Ear:    Auricle normally developed   EAC: normal   Tympanic membrane: intact   Middle Ear: No effusion present and Ossicles in normal position  Vestibular:    Spontaneous Nystagmus: none  Neuro/Psychiatric:     Affect: Appropriate   Eyes: EOMI intact  Respiratory: No increased WOB, no stridor       Data Review:   LABS    I have independently reviewed the lab results shown above. Findings significant for the conditions being treated include: none    IMAGING    No pertinent imaging " available    AUDIO    I have independently reviewed the following audiogram and reviewed the report. Findings as follows:     Pure Tone Audiometry: Symmetric  Right - Normal (0-25 dB) to Moderate (41-55 dB) high frequency sensorineural hearing loss  Left - Normal (0-25 dB) to Moderate (41-55 dB) high frequency sensorineural hearing loss    Tympanometry  Right: Type A  Left: Type A    Word Discrimination Score  Right: 100%  Left 100%    Acoustic Reflexes  Right Stimulation - Right reflex: DNT   Left Reflex: DNT  Left Stimulation - Right reflex: DNT  Left Reflex: DNT    Procedures:       Assessment:     1. Sensorineural hearing loss (SNHL) of both ears      Plan:     I had a long discussion with the patient regarding her condition. I believe that she is an appropriate candidate for hearing aids. I will give her the contact information of Vianey Weathers () our hearing aid coordinator. I also discussed with her the need to repeat her audiogram in 1 year. She was afforded an opportunity to ask questions. She showed good understanding and agreed with this plan.    Voice recognition software was used in the creation of this note/communication and any sound-alike errors which may have occurred from its use should be taken in context when interpreting. If such errors prevent a clear understanding of the note/communication, please contact the office for clarification.   Problem List Items Addressed This Visit    None  Visit Diagnoses       Sensorineural hearing loss (SNHL) of both ears    -  Primary

## 2025-01-27 NOTE — PROGRESS NOTES
Shahrzad Blanc, a 74 y.o. female, was seen today in the clinic for an audiologic evaluation. Ms. Blanc reported decreased hearing in both ears. She reported frequently having to ask others to repeat themselves. Patient denied tinnitus, otalgia, aural fullness, and dizziness.    Tympanometry revealed Type A tympanogram in the right ear and Type A tympanogram in the left ear. Audiogram results revealed normal hearing sensitivity steeply sloping to a moderately severe sensorineural hearing loss (SNHL) in the right ear and normal hearing sensitivity steeply sloping to a moderate SNHL in the left ear.  Speech reception thresholds were noted at 10 dBHL in the right ear and 10 dBHL in the left ear.  Speech discrimination scores were 100% in the right ear and 100% in the left ear.    Recommendations:  Otologic evaluation  Annual audiogram or sooner if change is perceived  Consider hearing aid consultation if patient feels hearing loss is affecting quality of life  Hearing protection in noise

## 2025-01-28 ENCOUNTER — OFFICE VISIT (OUTPATIENT)
Dept: DERMATOLOGY | Facility: CLINIC | Age: 75
End: 2025-01-28
Payer: MEDICARE

## 2025-01-28 DIAGNOSIS — Z12.83 SCREENING EXAM FOR SKIN CANCER: Primary | ICD-10-CM

## 2025-01-28 DIAGNOSIS — D48.5 NEOPLASM OF UNCERTAIN BEHAVIOR OF SKIN: ICD-10-CM

## 2025-01-28 DIAGNOSIS — L90.8 SKIN AGING: ICD-10-CM

## 2025-01-28 DIAGNOSIS — L82.1 SEBORRHEIC KERATOSES: ICD-10-CM

## 2025-01-28 DIAGNOSIS — L82.0 INFLAMED SEBORRHEIC KERATOSIS: ICD-10-CM

## 2025-01-28 PROCEDURE — 99213 OFFICE O/P EST LOW 20 MIN: CPT | Mod: PBBFAC,25 | Performed by: DERMATOLOGY

## 2025-01-28 PROCEDURE — 99999 PR PBB SHADOW E&M-EST. PATIENT-LVL III: CPT | Mod: PBBFAC,,, | Performed by: DERMATOLOGY

## 2025-01-28 PROCEDURE — 17110 DESTRUCTION B9 LES UP TO 14: CPT | Mod: 59,PBBFAC | Performed by: DERMATOLOGY

## 2025-01-28 PROCEDURE — 17110 DESTRUCTION B9 LES UP TO 14: CPT | Mod: S$PBB,,, | Performed by: DERMATOLOGY

## 2025-01-28 PROCEDURE — 11102 TANGNTL BX SKIN SINGLE LES: CPT | Mod: PBBFAC | Performed by: DERMATOLOGY

## 2025-01-28 PROCEDURE — 99203 OFFICE O/P NEW LOW 30 MIN: CPT | Mod: 25,S$PBB,, | Performed by: DERMATOLOGY

## 2025-01-28 PROCEDURE — 88342 IMHCHEM/IMCYTCHM 1ST ANTB: CPT | Performed by: PATHOLOGY

## 2025-01-28 PROCEDURE — 88305 TISSUE EXAM BY PATHOLOGIST: CPT | Performed by: PATHOLOGY

## 2025-01-28 PROCEDURE — 11102 TANGNTL BX SKIN SINGLE LES: CPT | Mod: S$PBB,XS,, | Performed by: DERMATOLOGY

## 2025-01-28 NOTE — PROGRESS NOTES
Subjective:      Patient ID:  Shahrzad Blanc is a 74 y.o. female who presents for   Chief Complaint   Patient presents with    Skin Check     TBSE     Shahrzad Blanc is a 74 y.o. female who presents for: FBSE screening exam for skin cancer.    New patient, would like Hydroquinone for age spots    The patient has the following lesions of concern:  Location: Inner R eye  Duration: 1 yr  Symptoms: none  Relieving factors/Previous treatments: none    Pertinent history:  History of blistering sunburns: Yes  History of tanning bed use: No  Family history of melanoma: Yes, Sisters  Personal history of mole removal: Yes  Personal history of skin cancer: No              Review of Systems   Skin:  Positive for daily sunscreen use, activity-related sunscreen use and wears hat. Negative for recent sunburn.   Hematologic/Lymphatic: Does not bruise/bleed easily.       Objective:   Physical Exam   Constitutional: She appears well-developed and well-nourished. No distress.   Neurological: She is alert and oriented to person, place, and time. She is not disoriented.   Psychiatric: She has a normal mood and affect.   Skin:   Areas Examined (abnormalities noted in diagram):   Scalp / Hair Palpated and Inspected  Head / Face Inspection Performed  Neck Inspection Performed  Chest / Axilla Inspection Performed  Abdomen Inspection Performed  Genitals / Buttocks / Groin Inspection Performed  Back Inspection Performed  RUE Inspected  LUE Inspection Performed  RLE Inspected  LLE Inspection Performed  Nails and Digits Inspection Performed                 Diagram Legend     Erythematous scaling macule/papule c/w actinic keratosis       Vascular papule c/w angioma      Pigmented verrucoid papule/plaque c/w seborrheic keratosis      Yellow umbilicated papule c/w sebaceous hyperplasia      Irregularly shaped tan macule c/w lentigo     1-2 mm smooth white papules consistent with Milia      Movable subcutaneous cyst with punctum c/w epidermal  inclusion cyst      Subcutaneous movable cyst c/w pilar cyst      Firm pink to brown papule c/w dermatofibroma      Pedunculated fleshy papule(s) c/w skin tag(s)      Evenly pigmented macule c/w junctional nevus     Mildly variegated pigmented, slightly irregular-bordered macule c/w mildly atypical nevus      Flesh colored to evenly pigmented papule c/w intradermal nevus       Pink pearly papule/plaque c/w basal cell carcinoma      Erythematous hyperkeratotic cursted plaque c/w SCC      Surgical scar with no sign of skin cancer recurrence      Open and closed comedones      Inflammatory papules and pustules      Verrucoid papule consistent consistent with wart     Erythematous eczematous patches and plaques     Dystrophic onycholytic nail with subungual debris c/w onychomycosis     Umbilicated papule    Erythematous-base heme-crusted tan verrucoid plaque consistent with inflamed seborrheic keratosis     Erythematous Silvery Scaling Plaque c/w Psoriasis     See annotation      Assessment / Plan:      Pathology Orders:       Normal Orders This Visit    Specimen to Pathology, Dermatology     Comments:    Number of Specimens:->1  ------------------------->-------------------------  Spec 1 Procedure:->Biopsy  Spec 1 Clinical Impression:->r/o BLK vs ISK  Spec 1 Source:->right forearm  Release to patient->Immediate  Send normal result to authorizing provider's In Basket if  patient is active on MyChart:->Yes    Questions:    Procedure Type: Dermatology and skin neoplasms    Number of Specimens: 1    ------------------------: -------------------------    Spec 1 Procedure: Biopsy    Spec 1 Clinical Impression: r/o BLK vs ISK    Spec 1 Source: right forearm    Clinical Information: pink papule 3 mm    Release to patient: Immediate    Send normal result to authorizing provider's In Basket if patient is active on MyChart: Yes          Screening exam for skin cancer    Total body skin examination performed today including at  least 12 points as noted in physical examination. Suspicious lesions noted.    Recommend daily sun protection/avoidance, use of at least SPF 30, broad spectrum sunscreen (OTC drug), skin self examinations, and routine physician surveillance to optimize early detection    Family history of melanoma  Advise yearly skin exams    Seborrheic keratoses  These are benign inherited growths without a malignant potential. Reassurance given to patient. No treatment is necessary.     Neoplasm of uncertain behavior of skin  -     Specimen to Pathology, Dermatology  Right forearm r/o NMSC  Shave biopsy procedure note:    Shave biopsy performed after verbal consent including risk of infection, scar, recurrence, need for additional treatment of site. Area prepped with alcohol, anesthetized with approximately 1.0cc of 1% lidocaine with epinephrine. Lesional tissue shaved with razor blade. Hemostasis achieved with application of aluminum chloride. No complications. Dressing applied. Wound care explained.    Inflamed seborrheic keratosis  Cryosurgery procedure note:    Verbal consent from the patient is obtained including, but not limited to, risk of hypopigmentation/hyperpigmentation, scar, recurrence of lesion. Liquid nitrogen cryosurgery is applied to 1 lesions to produce a freeze injury. The patient is aware that blisters may form and is instructed on wound care with gentle cleansing and use of vaseline ointment to keep moist until healed. The patient is supplied a handout on cryosurgery and is instructed to call if lesions do not completely resolve.           Would not advise HQ for face due to risk of over bleaching  Advise nightly use of RX tretinoin (pt has at home)    No follow-ups on file.1 yr

## 2025-01-29 DIAGNOSIS — R73.01 IMPAIRED FASTING GLUCOSE: ICD-10-CM

## 2025-01-29 NOTE — TELEPHONE ENCOUNTER
Refill Routing Note   Medication(s) are not appropriate for processing by [facility] for the following reason(s):        Non-participating provider    [facility] action(s):  Route             Appointments  past 12m or future 3m with PCP    Date Provider   Last Visit   12/12/2024 Raisa Alonzo MD   Next Visit   Visit date not found Raisa Alonzo MD   ED visits in past 90 days: 0        Note composed:2:12 PM 01/29/2025

## 2025-01-29 NOTE — TELEPHONE ENCOUNTER
----- Message from [employer] sent at 1/29/2025  1:53 PM CST -----  Type: RX REFILL REQUEST        Who Called: Patient      Refill or New Rx: Refill      Rx Name and Strength: blood sugar diagnostic (FREESTYLE LITE STRIPS) Strp      Pt went to  prescription from the pharmacy and was advised that they never received the refill prescription; Pt is out of strips.      Would the patient rather a call back or a response via [facility]? Call      Best Call Back Number: [phone]      Additional Information:    [facility]/pharmacy #[id number] - [place], [place] - [address]  [address] [address]  Phone: [phone] Fax: [phone]

## 2025-01-31 NOTE — PROGRESS NOTES
NEUROPSYCHOLOGY CONSULT (TELEHEALTH)  Referral Information  Name: Shahrzad Blanc  MRN: [id number]  : 1950  Age: 74 y.o.  Race: White  Gender: female  Referring Provider: Raisa Alonzo MD  Referral Diagnoses: R41.3 (ICD-10-CM) - Memory changes   [place]  Date Conducted: 2025    Telemedicine:   The patient location is: [place]  The provider location is: [place]  Total time spent with patient: 45 minutes  The chief complaint leading to consultation/medical necessity is: Cognitive change  Visit type: Virtual visit with synchronous audio and video  Each patient to whom I provide medical services by telemedicine is: (1) informed of the relationship between the physician and patient and the respective role of any other health care provider with respect to management of the patient; and (2) notified that they may decline to receive medical services by telemedicine and may withdraw from such care at any time.     Consent/Emergency Plan: The patient expressed an understanding of the purpose of the evaluation and consented to all procedures, including providing consent for Nicholas Hoffman, Ph.D., a clinical neuropsychology fellow under the supervision of Joaquina Toledo Psy.D. , to be involved in her care. We discussed the limits of confidentiality and discussed an emergency plan.     SUMMARY/TREATMENT PLAN   Ms. Blanc is a 74 y.o., right-handed, White, female with 20 years of formal education. She has a past medical history of Hyperlipemia. She is presenting for evaluation of cognitive complaints. Ms. Blanc reports stable cognitive symptoms since early . Most-recent laboratory studies are non-contributory. No neuroimaging or cognitive screener on file. She has not been evaluated by neurology. She is not prescribed any medications with anticholinergic burden. Sleep is good and she feels rested upon waking. She denied symptoms of anxiety or depression, however, she acknowledged some recent  stress. She is very active, walking 6-8 miles daily, and does yoga 4-5 times per week. She is fully oriented and remains functionally independent. She and her  live alone and split time between their home in [place] and [place]. No immediate safety concerns. She has a family history of Alzheimer's disease in paternal aunt and paternal uncle.     Ms. Blanc is scheduled to complete a neuropsychological evaluation on 02/10/2025 at 8:30am. Full report to follow. Differential include normal aging vs MCI.    Problem List Items Addressed This Visit    None  Visit Diagnoses       Memory changes    -  Primary             Thank you for allowing us to participate in Ms. Blanc's care. If you have any questions, please contact Dr. Toledo at [phone].     Nicholas Hoffman, Ph.D.  Postdoctoral Fellow in Clinical Neuropsychology  [facility] - Department of Neurology    Joaquina Toledo Psy.D.   Clinical Neuropsychologist   [facility] - Department of Neurology    HPI/CURRENT CONCERNS:   Ms. Blanc is a 74 y.o. woman with a medical history of hyperlipidemia who was referred by her PCP for baseline testing related to memory concerns.     Cognitive Functioning   Previous evaluation(s) & general findings: None reported  Onset & course of difficulty: Ms. Blanc reports cognitive changes have been present since 2024 and have remained relatively stable over time. Her main concerns are related to forgetfulness and more difficulty with recall.      Examples of cognitive concerns:   Attention/Working Memory: No changes reported   Executive Functioning/Planning: No changes reported  Processing Speed: No changes reported   Language: No changes reported  Visuospatial: No changes reported  Learning & Memory: She reports difficulty remembering names of people she knows but states their names eventually come back to her. She has fleeting thoughts and states that if she doesn't say what she is thinking immediately,  she forgets the thought. Sometimes she is able to recall the information later. She states this mostly happens at work, and specifically when she is on Zoom meetings and has to wait her turn to speak.    Exacerbating factors: None reported   Ameliorating factors: Writing down important information is helpful for her to refer back to.      Daily Functioning    ADLs  Self-Care Eating Safety Other   Independent and without difficulty  Independent and without difficulty  None reported      Instrumental IADLs:   Driving Medication Mgmt/Health Household Mgmt Finances   Independent and without difficulty  Independent and without difficulty  Independent and without difficulty  Ms. Blanc and her  co-manage their finances and most bills are on autopay.      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 She had bilateral cataract extraction, one eye at age 63 and the other at age 67.  Pain: None reported  Physical Exercise Routine: She walks 6-8 miles daily, and does yoga at home 4-5 times/week.    Psychiatric/Neuropsychiatric Symptoms   Depression: Denied  Current Ideation, Intention, or Plan: Denied   Homicidal Ideation, Intention, or Plan: Denied  Kiana/Hypomania: Denied  Anxiety: Denied  Stress: She worries about her family's health which can be stressful at times.  Coping Mechanisms: Denied  Social Withdrawal: Denied  Neurovegetative Sxs:  Appetite: No changes reported  Sleep: Sleep is good; gets 8 hours nightly and wakes feeling rested.   Energy: Good  Hallucinations: Denied  Delusional/Paranoid Thinking: Denied  Obsessive/Compulsive Behaviors:  Denied  Impulsivity/Compulsivity: Denied  Disinhibition: Denied  Irritability/Agitation: Denied  Aggression: Denied  Apathy/Indifference: Denied  Other changes in personality: Denied    RELEVANT HISTORY  Psychiatric History   Prior Diagnoses: None reported  History of Trauma/Abuse: no   History of Suicide Attempts: no   Medication(s): no   Hospitalization(s): no   Psychotherapy/Counseling: no   Other: no     Substance Use History   Ms. Blanc drinks alcohol (wine) on the weekends, typically has no more than 2 glasses in one sitting. She denied use of tobacco or other substances.     Neurological History    Headaches/Migraines: no   TBI: no   Seizures: no   Stroke: no   Tumor: no   Previous Episodes of Delirium: no   Movement Disorder: no   CNS Infection: no   Other: no     Family Neurological & Psychiatric History     family history includes Alzheimer's disease in her paternal aunt and paternal uncle; Arthritis in her sister; Diabetes in her father; Diverticulitis in her sister; Heart disease in her father; No Known Problems in her daughter and son; bladder issues in her mother.  Neurologic: Paternal aunt and uncle had Alzheimer's disease.  Psychiatric: Negative for heritable risk factors.    Development  Education   Born & raised: Born in [place]  Prenatal and  development: [facility]  Developmental milestones: [employer]  Language Acquisition: [place]ican first language. She learned English during school in [place]. She moved to the [place] at age 14 and completed muna high and high school in [place] She reads and writes in both [place] and English.  Level Attained: PhD romance language and literature  Learning/Attention/Behavior Difficulties: no  Repeated Grade(s): No, graduated high school at age 18        Occupation  Social    Service: no   Occupational Status: Retired in 2019 but started part-time work in 2022.  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 She had also previously served as a  and the mabel at the school.  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  Family Status:  51 years; 2 adult children  Current Living Situation: She and her  live alone. They have a home in [place] and in [place], and they split their time between the two. She will be heading back to [place] in April 2025.    Support System: Good  Hobbies/Activities: walking, yoga, cooking, reading, spending time with family, travel, being          Medical Status   Patient Active Problem List   Diagnosis    Hyperlipemia     Past Medical History:   Diagnosis Date    Hyperlipemia      Past Surgical History:   Procedure Laterality Date    CATARACT EXTRACTION W/ INTRAOCULAR LENS  IMPLANT, BILATERAL      COLONOSCOPY N/A 12/4/2023    Procedure: COLONOSCOPY;  Surgeon: Ro Cagle MD;  [facility] ENDOSCOPY;  Service: Endoscopy;  Laterality: N/A;  instructions to pt portal  11/28- pt states that she has suprep, instr reviewed and sent to portal, pre call complete.  [facility]       Neurodiagnostics   No results found for this or any previous visit.      Pertinent Lab Work   No results found for: "[id number]"  No results found for: "RPR"  No results found for: "FOLATE"  Lab Results   Component Value Date    TSH 1.966 12/12/2024     Lab Results   Component Value Date    HGBA1C 5.5 12/12/2024     No results found for: "HIV1X2", "[id number]"      Medications     Current Outpatient Medications:     blood sugar diagnostic (FREESTYLE LITE STRIPS) Strp, 1 strip by Misc.(Non-Drug; Combo Route) route once daily., Disp: 100 each, Rfl: 3    blood-glucose meter kit, To check glucose 1-3 times daily, to use with Accucheck or insurance preferred meter, Disp: 1 each, Rfl: 0    calcium citrate-vitamin D3 315-200 " mg (CITRACAL+D) 315 mg-5 mcg (200 unit) per tablet, Take 2 tablets by mouth once daily., Disp: , Rfl:     ezetimibe-simvastatin 10-20 mg (VYTORIN) 10-20 mg per tablet, , Disp: , Rfl:     multivitamin (ONE DAILY MULTIVITAMIN) per tablet, Take 1 tablet by mouth once daily., Disp: , Rfl:        OBJECTIVE:     MENTAL STATUS AND BEHAVIORAL OBSERVATIONS:  Appearance:  Casually dressed and Well groomed  Behavior:   alert, calm, cooperative, rapport easily established, and Appropriate interpersonal skills. Good interpretation of nonverbal cues.   Orientation:   Fully oriented  Sensory:   Hearing and vision adequate for virtual/telephone visit  Gait:   Not observed (virtual/telephone visit)   Psychomotor:  Not observed (virtual/telephone visit)  Speech:  Fluent and spontaneous. Normal volume, rate, pitch, tone, and prosody.  Language:  Receptive and expressive language appear intact. Comprehends conversational speech., No evidence of word-finding difficulties in conversational speech.  Mood:   euthymic  Affect:   mood-congruent  Thought Process: goal directed and linear  Thought Content: Denied current SI/HI. and No evidence of psychotic symptoms.  Memory:  recent and remote appear grossly intact  Attn/Concentration:  Grossly intact  Judgment/Insight: Grossly intact      ANTICIPATED TEST BATTERY:  MOCA, DCT, TOPF, WAIS (SI IN BD MR DS AR SS CD), NAB (Naming), COWAT (CFL), Category Fluency (Animals/Fruits/Vegetables), RCFT (Copy only), CVLT (3 Standard Form), WMS (LM VR), TMT, WCST (128), GDS, and TIM

## 2025-02-03 ENCOUNTER — OFFICE VISIT (OUTPATIENT)
Dept: NEUROLOGY | Facility: CLINIC | Age: 75
End: 2025-02-03
Payer: MEDICARE

## 2025-02-03 DIAGNOSIS — R41.3 MEMORY CHANGES: Primary | ICD-10-CM

## 2025-02-03 DIAGNOSIS — R73.01 IMPAIRED FASTING GLUCOSE: Primary | ICD-10-CM

## 2025-02-03 LAB
FINAL PATHOLOGIC DIAGNOSIS: NORMAL
GROSS: NORMAL
Lab: NORMAL
MICROSCOPIC EXAM: NORMAL

## 2025-02-03 NOTE — TELEPHONE ENCOUNTER
----- Message from Abdi sent at 2/3/2025  4:08 PM CST -----   Name of Who is Calling:     What is the request in detail: patient request call back in reference to insurance do not cover glucometer and strips   / insurance will only cover  Accu-Chek / patient want to know if can change prescription and send to pharmacy   Please contact to further discuss and advise      Can the clinic reply by [facility]:     What Number to Call Back if not in [facility]:   [phone]

## 2025-02-04 RX ORDER — INSULIN PUMP SYRINGE, 3 ML
EACH MISCELLANEOUS
Qty: 1 EACH | Refills: 0 | Status: SHIPPED | OUTPATIENT
Start: 2025-02-04 | End: 2026-02-04

## 2025-02-04 NOTE — TELEPHONE ENCOUNTER
I signed the pended order. Could you please call the pharmacy and have them also change the test strips and (if needed) and add on lancets, 100 each with 3 refills to Accucheck or the insurance preferred brand?

## 2025-02-04 NOTE — TELEPHONE ENCOUNTER
Called [facility] spoke to Sabino Accucheck test strips and lancets 100 each with  3 refills given to him per doctors  request. Sabino said to send in the order for the Accucheck glucometer it's the only way to send it through the insurance.   Also called patient to verify if she has a meter she said no and she was told the pharmacy will need her to bring in her insurance cards for this year   Order pended for Accucheck glucometer

## 2025-02-10 ENCOUNTER — OFFICE VISIT (OUTPATIENT)
Dept: NEUROLOGY | Facility: CLINIC | Age: 75
End: 2025-02-10
Payer: MEDICARE

## 2025-02-10 DIAGNOSIS — R41.89 COGNITIVE CHANGES: Primary | ICD-10-CM

## 2025-02-10 PROCEDURE — 99999 PR PBB SHADOW E&M-EST. PATIENT-LVL I: CPT | Mod: PBBFAC,,, | Performed by: PSYCHIATRY & NEUROLOGY

## 2025-02-10 PROCEDURE — 96139 PSYCL/NRPSYC TST TECH EA: CPT | Mod: ,,, | Performed by: PSYCHIATRY & NEUROLOGY

## 2025-02-10 PROCEDURE — 96138 PSYCL/NRPSYC TECH 1ST: CPT | Mod: ,,, | Performed by: PSYCHIATRY & NEUROLOGY

## 2025-02-10 PROCEDURE — 96133 NRPSYC TST EVAL PHYS/QHP EA: CPT | Mod: ,,, | Performed by: PSYCHIATRY & NEUROLOGY

## 2025-02-10 PROCEDURE — 99499 UNLISTED E&M SERVICE: CPT | Mod: S$PBB,,, | Performed by: PSYCHIATRY & NEUROLOGY

## 2025-02-10 PROCEDURE — 96132 NRPSYC TST EVAL PHYS/QHP 1ST: CPT | Mod: ,,, | Performed by: PSYCHIATRY & NEUROLOGY

## 2025-02-10 PROCEDURE — 99211 OFF/OP EST MAY X REQ PHY/QHP: CPT | Mod: PBBFAC | Performed by: PSYCHIATRY & NEUROLOGY

## 2025-02-24 DIAGNOSIS — Z00.00 ENCOUNTER FOR MEDICARE ANNUAL WELLNESS EXAM: ICD-10-CM

## 2025-02-24 NOTE — PROGRESS NOTES
NEUROPSYCHOLOGY CONSULT  Referral Information  Name: Shahrzad Blanc  MRN: [id number]  : 1950  Age: 74 y.o.  Race: White  Gender: female  Referring Provider: Raisa Alonzo MD  Referral Diagnoses: R41.3 (ICD-10-CM) - Memory changes   Date Conducted: 2025 & 02/10/2025  Consent/Emergency Plan: The patient expressed an understanding of the purpose of the evaluation and consented to all procedures, including providing consent for Nicholas Hoffman, Ph.D., a clinical neuropsychology fellow under the supervision of Joaquina Toledo Psy.D. , to be involved in her care. We discussed the limits of confidentiality and discussed an emergency plan.     SUMMARY/TREATMENT PLAN   Ms. Blanc is a 74 y.o., right-handed, White, female with 20 years of formal education. She has a past medical history of Hyperlipemia. She is presenting for evaluation of cognitive complaints. Ms. Blanc reports stable cognitive symptoms since early . Most-recent laboratory studies are non-contributory. No neuroimaging or cognitive screener on file. She has not been evaluated by neurology. She is not prescribed any medications with anticholinergic burden. Sleep is good and she feels rested upon waking. She denied symptoms of anxiety or depression, however, she acknowledged some recent stress. She is very active, walking 6-8 miles daily, and does yoga 4-5 times per week. She is fully oriented and remains functionally independent. She and her  live alone and split time between their home in [place] and [place]. No immediate safety concerns. She has a family history of Alzheimer's disease in paternal aunt and paternal uncle.     Ms. Blanc' premorbid intellectual abilities are estimated to be in the average to high average range. Neuropsychological evaluation suggests variable executive functioning, with notable difficulties on tests of set-shifting/multitasking and problem-solving, with intact abstraction. Performance on a  cognitive screener was below expectations and points were deducted in the areas of executive functioning, attention, and language. Performances on more extensive test measures were intact and/or consistent with premorbid estimates across all other cognitive domains including language, visual-spatial skills, attention, working memory, and processing speed. Both verbal and visual memory were intact; however, she made a notable amount of repetition (17) and intrusion (8) errors when learning and recalling unstructured information presented as a word-list. She denied clinical significant symptoms of anxiety and depression.     Results of neuropsychological testing suggest subtle executive functioning inefficiencies, with intact cognitive functioning across all other domains. Ms. Blanc is an intelligent woman and very bright at baseline. 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 She was also able to acknowledge that compensatory strategies (i.e., writing information down) are beneficial for reported cognitive concerns. The results of the current evaluation do not meet criteria for a cognitive diagnosis at this time. Given her family history of Alzheimer's disease and age, recommend monitoring over time. While she does have vascular risks (hyperlipidemia), this appears to be appropriately managed, per her primary care provider.     Problem List Items Addressed This Visit          Neuro    Cognitive changes - Primary     Plan  Ms. Blanc is scheduled for feedback on 03/13/2025. Will discuss recommendations and provide handouts on compensatory strategies for memory and executive functioning and brain health hygiene.  If interested, can place a referral to Neurology for further work-up   Neuropsychology follow-up:  1-2 years to assess for change over time. If Ms. Alcocer and/or her family notice cognitive or functional changes sooner, re-evaluation can be done sooner.       Thank you for allowing us to participate in Ms. Blanc's care. If you have any questions, please contact Dr. Toledo at [phone].     Nicholas Hoffman, Ph.D.  Postdoctoral Fellow in Clinical Neuropsychology  [facility] - Department of Neurology    Joaquina Toledo Psy.D.   Clinical Neuropsychologist   [facility] - Department of Neurology    HPI/CURRENT CONCERNS:   Ms. Blanc is a 74 y.o. woman with a medical history of hyperlipidemia who was referred by her PCP for baseline testing related to memory concerns.     Cognitive Functioning   Previous evaluation(s) & general findings: None reported  Onset & course of difficulty: Ms. Blanc reports cognitive changes have been present since 2024 and have remained relatively stable over time. Her main concerns are related to forgetfulness and more difficulty with recall.      Examples of cognitive concerns:   Attention/Working Memory: No changes reported   Executive Functioning/Planning: No changes reported  Processing Speed: No changes reported   Language: No changes reported  Visuospatial: No changes reported  Learning & Memory: She reports difficulty remembering names of people she knows but states their names eventually come back to her. She has fleeting thoughts and states that if she doesn't say what she is thinking immediately, she forgets the thought. Sometimes she is able to recall the information later. She states this mostly happens at work, and specifically when she is on Zoom meetings and has to wait her turn to speak.    Exacerbating factors: None reported   Ameliorating factors: Writing down important information is helpful for her to refer back to.      Daily Functioning    ADLs  Self-Care Eating Safety Other   Independent and without difficulty  Independent and without difficulty  None  reported      Instrumental IADLs:   Driving Medication Mgmt/Health Household Mgmt Finances   Independent and without difficulty  Independent and without difficulty  Independent and without difficulty  Ms. Blanc and her  co-manage their finances and most bills are on autopay.      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 She had bilateral cataract extraction, one eye at age 63 and the other at age 67.  Pain: None reported  Physical Exercise Routine: She walks 6-8 miles daily, and does yoga at home 4-5 times/week.    Psychiatric/Neuropsychiatric Symptoms   Depression: Denied  Current Ideation, Intention, or Plan: Denied   Homicidal Ideation, Intention, or Plan: Denied  Kiana/Hypomania: Denied  Anxiety: Denied  Stress: She worries about her family's health which can be stressful at times.  Coping Mechanisms: Denied  Social Withdrawal: Denied  Neurovegetative Sxs:  Appetite: No changes reported  Sleep: Sleep is good; gets 8 hours nightly and wakes feeling rested.   Energy: Good  Hallucinations: Denied  Delusional/Paranoid Thinking: Denied  Obsessive/Compulsive Behaviors: Denied  Impulsivity/Compulsivity: Denied  Disinhibition: Denied  Irritability/Agitation: Denied  Aggression: Denied  Apathy/Indifference: Denied  Other changes in personality: Denied    RELEVANT HISTORY  Psychiatric History   Prior Diagnoses: None reported  History of Trauma/Abuse: no   History of Suicide Attempts: no   Medication(s): no   Hospitalization(s): no   Psychotherapy/Counseling: no   Other: no     Substance Use History   Ms. Blanc drinks alcohol (wine) on the weekends, typically has no  more than 2 glasses in one sitting. She denied use of tobacco or other substances.     Neurological History    Headaches/Migraines: no   TBI: no   Seizures: no   Stroke: no   Tumor: no   Previous Episodes of Delirium: no   Movement Disorder: no   CNS Infection: no   Other: no     Family Neurological & Psychiatric History     family history includes Alzheimer's disease in her paternal aunt and paternal uncle; Arthritis in her sister; Diabetes in her father; Diverticulitis in her sister; Heart disease in her father; No Known Problems in her daughter and son; bladder issues in her mother.  Neurologic: Paternal aunt and uncle had Alzheimer's disease.  Psychiatric: Negative for heritable risk factors.    Development  Education   Born & raised: Born in [place]  Prenatal and  development: wnl  Developmental milestones: wnl  Language Acquisition: Prydein[place] first language. She learned English during school in [place]. She moved to the [place] at age 14 and completed [employer] high and [place] school [place] She reads and writes in both P[place] and English.  Level Attained: PhD romance language and literature  Learning/Attention/Behavior Difficulties: no  Repeated Grade(s): No, graduated high school at age 18        Occupation  Social    Service: no   Occupational Status: Retired in 2019 but started part-time work in 2022.  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 She had also previously served as a  and the mabel at the school.  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  Family Status:  51 years; 2 adult children  Current Living Situation: She and her  live alone. They have a home in [place] and in [place], and they split their time between the two. She  "will be heading back to [place] in April 2025.    Support System: Good  Hobbies/Activities: walking, yoga, cooking, reading, spending time with family, travel, being            Medical Status   Patient Active Problem List   Diagnosis    Hyperlipemia    Cognitive changes     Past Medical History:   Diagnosis Date    Hyperlipemia      Past Surgical History:   Procedure Laterality Date    CATARACT EXTRACTION W/ INTRAOCULAR LENS  IMPLANT, BILATERAL      COLONOSCOPY N/A 12/4/2023    Procedure: COLONOSCOPY;  Surgeon: Ro Cagle MD;  Location: [facility] ENDOSCOPY;  Service: Endoscopy;  Laterality: N/A;  instructions to pt portal  11/28- pt states that she has suprep, instr reviewed and sent to portal, pre call complete.  DB       Neurodiagnostics   No results found for this or any previous visit.      Pertinent Lab Work   No results found for: "BABYYBCK16"  No results found for: "RPR"  No results found for: "FOLATE"  Lab Results   Component Value Date    TSH 1.966 12/12/2024     Lab Results   Component Value Date    HGBA1C 5.5 12/12/2024     No results found for: "HIV1X2", "[id number]"      Medications     Current Outpatient Medications:     blood sugar diagnostic (FREESTYLE LITE STRIPS) Strp, 1 strip by Misc.(Non-Drug; Combo Route) route once daily., Disp: 100 each, Rfl: 3    blood-glucose meter kit, To check glucose 1-3 times daily, to use with Accucheck or insurance preferred meter, Disp: 1 each, Rfl: 0    calcium citrate-vitamin D3 315-200 mg (CITRACAL+D) 315 mg-5 mcg (200 unit) per tablet, Take 2 tablets by mouth once daily., Disp: , Rfl:     ezetimibe-simvastatin 10-20 mg (VYTORIN) 10-20 mg per tablet, , Disp: , Rfl:     multivitamin (ONE DAILY MULTIVITAMIN) per tablet, Take 1 tablet by mouth once daily., Disp: , Rfl:        OBJECTIVE:     ANTICIPATED TEST BATTERY:  MOCA, DCT, TOPF, WAIS (SI IN BD MR DS AR SS CD), NAB (Naming), COWAT (CFL), Category Fluency (Animals/Fruits/Vegetables), RCFT (Copy only), CVLT " (3 Standard Form), WMS (LM VR), TMT, WCST (128), GDS, and TIM      OBJECTIVE:     MENTAL STATUS AND BEHAVIORAL OBSERVATIONS:  Appearance:  Casually dressed and Well groomed  Behavior:   alert, calm, cooperative, rapport easily established, and Appropriate interpersonal skills.  Good interpretation of nonverbal cues.   Orientation:   Fully oriented  Sensory:   Hearing and vision appeared adequate for testing purposes.  Gait:   Unremarkable and Pt ambulates independently.   Psychomotor:  Within Normal Limits  Speech:  Fluent and spontaneous. Normal volume, rate, pitch, tone, and prosody.  Language:  Receptive and expressive language appear intact. Comprehends conversational speech.,  No evidence of word-finding difficulties in conversational speech.  Mood:   euthymic  Affect:   mood-congruent  Thought Process: goal directed and linear  Thought Content: Denied current SI/HI. and No evidence of psychotic symptoms.  Judgment/Insight: Grossly intact  Validity:  Valid, PVT WNL  Test Taking Bx: Performance on standalone and/or embedded performance validity measures all suggested  adequate engagement.   Behaviorally, the pt appeared to put forth good effort, and cognitive test scores are generally commensurate with their overall functional level.   As a result, scores are considered a valid reflection of the pt's functioning.  Test Taking Bx:  Per psychometrist observations, Ms. Blanc arrived to the testing appointment on time and  alone. She completed all tasks asked of her. She required repetition and/or clarification of test instructions/prompts at times. She exhibited some impulsivity on a visual memory task and attempted to draw before being instructed to. On a mental math test, she often doubted her answers after producing the correct response.     PROCEDURES/TESTS ADMINISTERED:  In addition to performing a review of pertinent medical records, reviewing limits to confidentiality, conducting a clinical interview, and  explaining procedures, the following measures were administered:   Dorian Cognitive Assessment (MOCA), DCT, Test of Premorbid Functioning (TOPF), Wechsler Adult Intelligence Scale - Fourth Edition (WAIS - SI IN BD MR DS AR SS CD), Neuropsychological Assessment Battery (NAB - Naming), Controlled Oral Word Association Test (COWAT) (MOANS/MOAANS 2005 norms), Animal Naming (Luis et al, 2004 norms), Shaan Complex Figure Test (Copy - only), [place] Verbal Learning Test - 2nd Edition, Standard Form (CVLT), Wechsler Memory Scale (WMS - LM, VR), Trail Making Test (TMT - Luis et al, MOANS/MOAANS 2005 norms), [place] Card Sorting Test (WCST-128), Geriatric Depression Scale (GDS), and Dutton Anxiety Inventory (TIM). Manual norms were used unless otherwise indicated.     NEUROPSYCHOLOGICAL ASSESSMENT RESULTS:  The following should not be interpreted in isolation from the neuropsychological evaluation report. Scores on stand-alone and/or embedded performance validity measures were within normal limits.    PREMORBID FUNCTIONING Raw Score Type of Standardized Score Standardized Score Percentile/CP Descriptor   TOPF simple dem. eFSIQ -  86 High Average   TOPF pred. eFSIQ -  84 High Average   TOPF simple + pred. eFSIQ -  84 High Average   INTELLECTUAL FUNCTIONING Raw Score Type of Standardized Score Standardized Score Percentile/CP Descriptor   WAIS-IV         VCI -  91 Above Average   BEL - SS 98 45 Average   WMI -  55 Average   PSI -  63 Average   FSIQ -  70 Average   GAI - ss 110 75 High Average   COGNITIVE SCREENING Raw Score Type of Standardized Score Standardized Score Percentile/CP Descriptor   MoCA 24 - - - Impaired   Orientation - Place 2/2 - - - -   Orientation - Date 4/4 - - - -   LANGUAGE FUNCTIONING Raw Score Type of Standardized Score Standardized Score Percentile/CP Descriptor   WAIS-IV Information 21 ss 13 84 High Average   TOPF Word Reading 61  88 High Average    NAB Naming 29 Tscore 44 27 Average   NAB Naming Percent Correct After Semantic Cuing 0 - - 38 Average   NAB Naming Percent Correct After Phonemic Cuing 50 - - 47 Average   COWAT 37 ss 9 37 Average   Animal Naming 28 Tscore 60 84 High Average   VISUOSPATIAL FUNCTIONING Raw Score Type of Standardized Score Standardized Score Percentile/CP Descriptor   WAIS-IV Block Design 29 ss 10 50 Average   RCFT Copy 33 - - >16 WNL   RCFT Time to Copy 162 - - >16 WNL   VR Copy 41 - - 26-50 Average   LEARNING & MEMORY Raw Score Type of Standardized Score Standardized Score Percentile/CP Descriptor   CVLT-II         Trials 1-5 (T-Score) 66 Tscore 73 99 Exceptionally High   List A Trial 1 9 zscore 2 97.725 Exceptionally High   List A Trial 5 15 zscore 1.5 93 Above Average   List B 7 zscore 1.5 93 Above Average   SDFR 13 zscore 1 84 High Average   SDCR 14 zscore 1 84 High Average   LDFR 14 zscore 1 84 High Average   LDCR 13 zscore 0.5 69 Average   Semantic Clustering 5.2 zscore 3.5 100 Exceptionally High   Learning [place] 1.7 zscore 0.5 69 Average   Repetitions 17 zscore 2.5 99 Exceptionally High   Intrusions 8 zscore 1 84 High Average   Recognition Hits 15 zscore 0 50 Average   False Positives 1 zscore -0.5 31 Average   Discriminability 3.4 zscore 0.5 69 Average   WMS-IV         Auditory Immediate (additional score) -  87 High Average   Auditory Delayed (additional score) -  91 Above Average   Auditory Memory -  91 Above Average   Visual Memory -  70 Average   WMS-IV Subtests         LM I 33 ss 11 63 Average   LM II 28 ss 14 91 Above Average   LM Recognition 21 - - >75 High Average   (CVLT-II Trials 1-5) 73  15 95 Above Average   (CVLT-II Long Delay) 1  13 84 High Average   VR I 33 ss 11 63 Average   VR II 25 ss 12 75 High Average   VR II Recognition 7 - - >75 High Average   VR Copy 41 - - 26-50 Average   ATTENTION/WORKING MEMORY Raw Score Type of Standardized Score Standardized Score Percentile/CP Descriptor    WAIS-IV Digit Span 26 ss 11 63 Average         DS Forward 10 ss 10 50 Average         DS Backward 6 ss 8 25 Average         DS Sequence 10 ss 13 84 High Average         Longest Digit Forward 6 - - - -         Longest Digit Backward 3 - - - -         Longest Digit Sequence 7 - - - -   WAIS-IV Arithmetic 13 ss 10 50 Average   MENTAL PROCESSING SPEED Raw Score Type of Standardized Score Standardized Score Percentile/CP Descriptor   WAIS-IV Symbol Search 21 ss 9 37 Average   WAIS-IV Coding 67 ss 13 84 High Average   TMT A  38 Tscore 44 27 Average   TMT A Total Err. 1 - - - -   TMT A Seq. Err. 1 - - - -   EXECUTIVE FUNCTIONING Raw Score Type of Standardized Score Standardized Score Percentile/CP Descriptor   TMT B 140 Tscore 29 2 Exceptionally Low    TMT B Total Err. 3 - - - -   TMT B Seq Err. 2 - - - -   TMT B Set-Loss Err. 1 - - - -   WCST-128         Total Correct   58 - -   Total Errors 70 SS 76 5 Below Average   Perseverative Resp. 66 SS 73 4 Below Average   Perseverative Err. 53 SS 74 4 Below Average   Nonperseverative Err. 17 SS 90 25 Average   Concept. Level Response 39 SS 79 8 Below Average   Categories Completed 2 - - >16 WNL   FMS 1 - - >16 WNL   WAIS-IV Similarities 31 ss 14 91 Above Average   WAIS-IV Matrix Reasoning 10 ss 9 37 Average   MOOD & PERSONALITY Raw Score Type of Standardized Score Standardized Score Percentile/CP Descriptor   GDS-30 2 - - - WNL   TIM 0 - - - Minimal   ss = scaled score (mean = 10, SD = 3); SS = standard score (mean = 100, SD = 15); Tscore mean = 50, SD = 10; zscore (mean = 0.00, SD = 1); WNL = Within Normal Limits     PROCESS NOTES:   PERFORMANCE VALIDITY: WNL   PREMORBID: Estimated to be in the average to high average range, consistent with educational and occupational history.  GLOBAL COGNITIVE FUNCTIONING: Performance on a global cognitive screener was below expectation. Pt lost points for: misplacing the hands and not having all the numbers on a clock drawing (-2), serial  7s (-1), sentence repetition (-1), abstraction (-1), and delayed recall (-1).  LANGUAGE: intact . She 5 repetition errors on a letter fluency task.   VISUOSPATIAL: intact . No broken configuration on a block construction task. No misperceptions on a confrontation naming task. Copy of geometric figure was WNL overall, with all aspects of the figure present, though there were 3 inattentive errors. Pt was able to appreciate the gestalt of the image. Visuoperception is WNL. No evidence of luis visuospatial dysfunction.   LEARNING/MEMORY: intact . Pt demonstrated a steep learning curve on a word list (9, 11, 15, 16, 15/16), with use of a semantic learning strategy. No evidence of proactive or retroactive interference. Pt was able to recall 86% of initially encoded information after a delay. She benefited from provision of semantic cues. Performance on the associated recognition measure was intact , and notable for semantically 1 related false positive error. Learning and recall trials were noticeable for repetition (17 total) and intrusion (8 total) errors. Performance was intact on a contextual story memory task. Pt did benefit from repetition. Recognition memory for story information was intact . Visual memory encoding and retrieval were WNL, with intact  performance on the associated recognition measure. Overall, pt demonstrated intact  initial encoding for both structured and unstructured information. Free recall was intact  overall. Memory consolidation is considered intact overall.   ATTENTION/WORKING MEMORY: intact . Simple attention span was intact  (repeating 6 digits forward). Working memory span was intact  (repeating 3 digits backwards and 7 digits sequenced). Performance was intact on a mental arithmetic task.  PROCESSING SPEED: intact  Average performance on a speeded, simple visual-motor task, with one sequencing error. Intact performances on a speeded symbol matching and a speeded symbol coding task.    EXECUTIVE FUNCTIONING: variable Mental flexibility and set shifting was impaired, with 2 sequencing errors and 1 set-loss error. Verbal and nonverbal abstraction were intact . Performance was below expectation on a task of novel problem solving requiring the pt to learn from feedback. On this task, she had difficulty getting the first set correct, and had notable perseveration and set-loss errors, including some non-set responses.   MOTOR: No motor tests were administered.  FUNCTIONAL: No functional measures were administered.  MOOD/PERSONALITY: Pt did not report clinically significant levels of anxiety or depression.       Billing/Services Summary          Neurobehavioral Status Exam Base Code (36554)  Total Units: 0    Face-to-face Total Time: 0 min.         Professional Neuropsychological Testing Evaluation Services Base Code (61722)   Total Units: 1 Add-on (13459)  Total Units: 2   Referral review/initial test selection 15 min.    Intra-session Clinical Decision-Making 0         Tech consult/test review/modification 0 min.         Patient behavior management 0 min.    Face-to-face Interpretive Feedback 60 min.    Record Review/Integration/Report Generation 120 min.     Total Time: 195 min.         Test Administration by Technician  Technician Name: AALIYAH Barragan Base Code (96980)   Total Units: 1 Add-on (34753)\  Total Units: 8   Face-to-Face Testin min.    Scoring 95 min.     Total Time: 261 min.    DOS is the date of the evaluation unless specified

## 2025-03-10 PROBLEM — R41.89 COGNITIVE CHANGES: Status: ACTIVE | Noted: 2025-03-10

## 2025-03-10 NOTE — PATIENT INSTRUCTIONS
Recommendations:   Recommendations for Ms. Blanc:  Executive Functioning:  Don't attempt to multi-task.  Separate tasks so that each can be completed one at a time.  Consider using a calendar/day planner, as that may be effective to help you plan and stay on track.  Color-coding specific tasks by importance may add additional benefit to your planner.  Break down large projects into smaller tasks and write down the steps to completing the task.  Taking notes while reading can help with recall.  Storing Information: Use the below strategies to help you further enhance how information is stored.  Rehearse - Immediately after seeing/hearing something, try to recall it.  Wait a few minutes, then check again.  Gradually lengthen the intervals between rehearsals.  Repetition of learned material is critical to ensure storage of information to be learned. Self-test at home to ensure learning.  Write down important information to improve your attention and focus and to have something to look back on when you need to recall it.  Make sure the person doesn't rattle off, but presents in a clear, logical, and unhurried manner.   Recalling Information:  Jog your memory - Lose something?  Think back to when you last had it.  What did you do next?  And after that?  Mentally walk yourself through each activity that followed.  Prodding your memory this way may enable you to recall the location of the missing item.  Use a cue - Symbolic reminders (the proverbial string around the finger) are helpful.  So too are memos, timers, calendar notes, etc.--keep them in visible, appropriate places.  Get organized - Have fixed locations for all important papers, key phone numbers, medications, keys, wallet, glasses, tools, etc.  Develop routines - Routines can anchor memories so they do not drift away.      Practice good cognitive/brain health hygiene:  Engage in regular exercise, which increases alertness and arousal and can improve attention  and focus. Ms. Blanc walks 6-8 miles daily, and does yoga at home 4-5 times/week and is encouraged to continue staying active.  Get a good nights sleep, as this can enhance alertness and cognition.  Eat healthy foods and balanced meals. It is notable that research indicates certain nutrients may aid in brain function, such as B vitamins (especially B6, B12, and folic acid), antioxidants (such as vitamins C and E, and beta carotene), and Omega-3 fatty acids. Following a Mediterranean diet, which emphasizes plant-based foods, whole grains, fish and healthy fats, such as olive oil, and has been shown to be brain protective. Talk with your physician or nutritionist about whats right for you.   Keep your brain active. Find activities to stay mentally active, such as reading, games (cards, checkers), puzzles (crosswords, Sudoku, jig saw), crafts (models, woodworking), gardening, or participating in activities in the community.  Stay socially engaged. Continue staying active with your family and friends.  Managing vascular risks. Take all medications as prescribed, check blood pressure, cholesterol levels, blood sugar, and others as appropriate.

## 2025-03-13 ENCOUNTER — OFFICE VISIT (OUTPATIENT)
Dept: NEUROLOGY | Facility: CLINIC | Age: 75
End: 2025-03-13
Payer: MEDICARE

## 2025-03-13 DIAGNOSIS — R41.89 COGNITIVE CHANGES: Primary | ICD-10-CM

## 2025-03-13 NOTE — PROGRESS NOTES
NEUROPSYCHOLOGICAL EVALUATION FEEDBACK    Shahrzad Guanaco attended a feedback session today. We discussed the results of the neuropsychological evaluation (30 minutes). Handouts provided in AVS. All of her questions were answered.    1. Cognitive changes            PLAN:   Pt interested in further work-up with Neurology. Will place referral.   Neuropsychology follow-up in 1-2 years to assess for change over time. If pt and/or her family notice cognitive or functional changes sooner, re-evaluation can be done sooner.       Nicholas Hoffman, Ph.D.  Postdoctoral Fellow in Clinical Neuropsychology  [facility] - Department of Neurology    Joaquina Toledo PsyD  Licensed Clinical Neuropsychologist  [facility] - Department of Neurology

## 2025-03-17 ENCOUNTER — PATIENT MESSAGE (OUTPATIENT)
Dept: NEUROLOGY | Facility: CLINIC | Age: 75
End: 2025-03-17
Payer: MEDICARE

## 2025-03-19 ENCOUNTER — TELEPHONE (OUTPATIENT)
Dept: NEUROLOGY | Facility: CLINIC | Age: 75
End: 2025-03-19
Payer: MEDICARE

## 2025-03-19 NOTE — TELEPHONE ENCOUNTER
----- Message from Nicholas Hoffman sent at 3/14/2025  9:11 AM CDT -----  Regarding: [facility]  Good morning, I just saw this patient with Dr. Toledo. She has never seen Neurology before. We found some subclincal findings on testing. She is interested in additional work-up with Neurology. Seems like she would be a good fit for [facility]. She does split her time between [place] and [place] and will be leaving to go back to [place] at the end of April 2025. Thank you!Nicholas

## 2025-03-20 ENCOUNTER — LAB VISIT (OUTPATIENT)
Dept: LAB | Facility: OTHER | Age: 75
End: 2025-03-20
Attending: NURSE PRACTITIONER
Payer: MEDICARE

## 2025-03-20 ENCOUNTER — OFFICE VISIT (OUTPATIENT)
Dept: INTERNAL MEDICINE | Facility: CLINIC | Age: 75
End: 2025-03-20
Payer: MEDICARE

## 2025-03-20 VITALS
WEIGHT: 142.75 LBS | OXYGEN SATURATION: 98 % | HEIGHT: 68 IN | BODY MASS INDEX: 21.63 KG/M2 | SYSTOLIC BLOOD PRESSURE: 100 MMHG | HEART RATE: 67 BPM | DIASTOLIC BLOOD PRESSURE: 57 MMHG

## 2025-03-20 DIAGNOSIS — R41.89 COGNITIVE CHANGES: ICD-10-CM

## 2025-03-20 DIAGNOSIS — Z00.00 ENCOUNTER FOR MEDICARE ANNUAL WELLNESS EXAM: ICD-10-CM

## 2025-03-20 DIAGNOSIS — Z00.00 ENCOUNTER FOR MEDICARE ANNUAL WELLNESS EXAM: Primary | ICD-10-CM

## 2025-03-20 DIAGNOSIS — E78.5 HYPERLIPIDEMIA, UNSPECIFIED HYPERLIPIDEMIA TYPE: ICD-10-CM

## 2025-03-20 LAB — HCV AB SERPL QL IA: NEGATIVE

## 2025-03-20 PROCEDURE — 36415 COLL VENOUS BLD VENIPUNCTURE: CPT | Performed by: NURSE PRACTITIONER

## 2025-03-20 PROCEDURE — 86803 HEPATITIS C AB TEST: CPT | Performed by: NURSE PRACTITIONER

## 2025-03-20 PROCEDURE — 99214 OFFICE O/P EST MOD 30 MIN: CPT | Mod: PBBFAC | Performed by: NURSE PRACTITIONER

## 2025-03-20 PROCEDURE — 99999 PR PBB SHADOW E&M-EST. PATIENT-LVL IV: CPT | Mod: PBBFAC,,, | Performed by: NURSE PRACTITIONER

## 2025-03-20 NOTE — PROGRESS NOTES
"  Shahrzad Blanc presented for an initial Medicare AWV today. The following components were reviewed and updated:    Medical history  Family History  Social history  Allergies and Current Medications  Health Risk Assessment  Health Maintenance  Care Team    **See Completed Assessments for Annual Wellness visit with in the encounter summary    The following assessments were completed:  Depression Screening  Cognitive function Screening  Timed Get Up Test  Whisper Test      Opioid documentation:      Patient does not have a current opioid prescription.            Vitals:    03/20/25 1318   BP: (!) 100/57   BP Location: Left arm   Patient Position: Sitting   Pulse: 67   SpO2: 98%   Weight: 64.8 kg (142 lb 12 oz)   Height: 5' 8" (1.727 m)     Body mass index is 21.7 kg/m².       Physical Exam  Constitutional:       Appearance: Normal appearance.   Pulmonary:      Effort: Pulmonary effort is normal.      Breath sounds: Normal breath sounds.   Musculoskeletal:         General: Normal range of motion.   Neurological:      Mental Status: She is alert and oriented to person, place, and time.   Psychiatric:         Mood and Affect: Mood normal.           Diagnoses and health risks identified today and associated recommendations/orders:  1. Encounter for Medicare annual wellness exam  Annual Health Risk Assessment (HRA) visit today.  Counseling and referral of health maintenance and preventative health measures performed.  Patient given annual wellness paperwork to take home.  Encouraged to return in 1 year for subsequent HRA visit.   - Referral to Enhanced Annual Wellness Visit (eAWV) W+1    2. Cognitive changes  Chronic. Stable. Continue current treatment plan as previously prescribed by PCP.    3. Hyperlipidemia, unspecified hyperlipidemia type  Chronic. Stable. Patient s not on a statin. Continue current treatment plan as previously prescribed by PCP.        Provided Shahrzad with a 5-10 year written screening schedule " and personal prevention plan. Recommendations were developed using the USPSTF age appropriate recommendations. Education, counseling, and referrals were provided as needed.  After Visit Summary printed and given to patient which includes a list of additional screenings\tests needed.    Follow up in about 1 year (around 3/20/2026).      Allen Reyes NP

## 2025-03-20 NOTE — PATIENT INSTRUCTIONS
Counseling and Referral of Other Preventative  (Italic type indicates deductible and co-insurance are waived)    Patient Name: Shahrzad Blanc  Today's Date: 3/20/2025    Health Maintenance       Date Due Completion Date    Hepatitis C Screening Never done ---    TETANUS VACCINE Never done ---    Pneumococcal Vaccines (Age 50+) (1 of 1 - PCV) Never done ---    COVID-19 Vaccine (5 - 2024-25 season) 09/01/2024 3/11/2024    Mammogram 12/04/2025 12/4/2024    Colorectal Cancer Screening 12/04/2026 12/4/2023    DEXA Scan 12/16/2027 12/16/2024    RSV Vaccine (Age 60+ and Pregnant patients) (1 - 1-dose 75+ series) 12/18/2027 ---    Lipid Panel 12/12/2029 12/12/2024        No orders of the defined types were placed in this encounter.    The following information is provided to all patients.  This information is to help you find resources for any of the problems found today that may be affecting your health:                  Living healthy guide: www.[facility]      Understanding Diabetes: www.diabetes.org      Eating healthy: www.cdc.gov/healthyweight      CDC home safety checklist: www.cdc.gov/steadi/patient.html      Agency on Aging: www.goea.[place].gov      Alcoholics anonymous (AA): www.aa.org      Physical Activity: www.flor.nih.gov/on8uuxx      Tobacco use: www.quitwithusla.org

## 2025-03-25 ENCOUNTER — PATIENT MESSAGE (OUTPATIENT)
Dept: NEUROLOGY | Facility: CLINIC | Age: 75
End: 2025-03-25
Payer: MEDICARE

## 2025-03-29 ENCOUNTER — OFFICE VISIT (OUTPATIENT)
Dept: NEUROLOGY | Facility: CLINIC | Age: 75
End: 2025-03-29
Payer: MEDICARE

## 2025-03-29 DIAGNOSIS — R41.89 COGNITIVE CHANGES: Primary | ICD-10-CM

## 2025-03-29 DIAGNOSIS — Z82.0 FAMILY HISTORY OF ALZHEIMER DISEASE: ICD-10-CM

## 2025-03-29 DIAGNOSIS — R41.3 MEMORY LOSS: ICD-10-CM

## 2025-03-29 NOTE — PROGRESS NOTES
Neurology Telemedicine Note     Name: Shahrzad Blanc  MRN: [id number]   CSN: [id number]      Date: 03/29/2025    The patient location is: Home  The chief complaint leading to consultation is: memory loss, cognitive changes   Visit type: Virtual visit with synchronous audio and video    TOTAL TIME SPENT: 30 mins    Each patient to whom I provide medical services by telemedicine is:  (1) informed of the relationship between the physician and patient and the respective role of any other health care provider with respect to management of the patient; and (2) notified that they may decline to receive medical services by telemedicine and may withdraw from such care at any time.    History of Present Illness (HPI):  Patient is a 74-year-old female with no significant past medical history who presents to tele Neurology Clinic due to evaluation of cognitive issues and memory loss.  Patient recently had a neuropsychological evaluation which was reassuring and only showed subtle changes in executive functioning but was overall reassuring.  Patient states she has a paternal family history of Alzheimer's and would like to make sure that if he does obtain this, that we can get ahead of it.  There is no family history of early-onset dementia.  Patient is not having any symptoms of getting lost while driving and continues to function as an  without any issues.  Patient denies losing track of time, getting confused or getting lost while driving as well as leaving things on the stove to burn.  We will obtain MRI of the brain to evaluate parenchyma structurally as well as obtain metabolic workup to make sure there is nothing reversible that could be causing cognitive issues.  Eventually we will probably end up getting a amyloid PET scan to further evaluate.    Nonmotor/Premotor ROS: as per HPI, and all other systems are negative    Past Medical History: The patient  has a past medical history of  Hyperlipemia.    Social History: The patient  reports that she has never smoked. She has never used smokeless tobacco. She reports current alcohol use of about 2.0 standard drinks of alcohol per week. She reports that she does not use drugs.    Family History: Their family history includes Alzheimer's disease in her paternal aunt and paternal uncle; Arthritis in her sister; Diabetes in her father; Diverticulitis in her sister; Heart disease in her father; No Known Problems in her daughter and son; bladder issues in her mother.    Allergies: Patient has no known allergies.     Meds: Medications Ordered Prior to Encounter[1]    Exam:  Vital Signs deferred with home visit    Constitutional  Well-developed, well-nourished, appears stated age   Eyes  No scleral icterus   ENT  Moist oral mucosa   Cardiovascular  No lower extremity edema    Respiratory  No labored breathing    Skin  No rashes   Hematologic  No bruising   Psychiatric  Normal mood and affect   Other  GI/ deferred    Neurological     * Mental status  Alert and oriented to person, place, time, and situation; no dysarthria; no aphasia; normal recent and remote memory; follows commands   * Cranial nerves     - CN II  Pupils midposition and symmetric   - CN III, IV, VI  Extraocular movements full, no nystagmus visualized   - CN V  Unable to test   - CN VII  Face strong and symmetric bilaterally    - CN VIII  Hearing grossly intact with conversation    - CN IX, X  Palate raises midline and symmetric    - CN XI  Strong shoulder shrug B    - CN XII  Tongue appears midline    * Motor  Normal bulk by appearance, no drift    * Sensory  Not tested objectively, no changes described by the patient   * Deep tendon reflexes  Not tested   * Coord/Movemt/Gait No hypophonic speech.  No facial masking.  No B bradykinesia.  No tremor with rest, posture, kinesis, or intention.   No chorea, athetosis, dystonia, myoclonus, or tics.   No motor impersistence.  Gait is deferred for  safety.       Medical Record Review:  - Lab Results:  Lab Visit on 03/20/2025   Component Date Value Ref Range Status    Hepatitis C Ab 03/20/2025 Negative  Negative Final   Office Visit on 01/28/2025   Component Date Value Ref Range Status    Final Pathologic Diagnosis 01/28/2025    Final                    Value:1. Skin, right forearm, shave biopsy:   - ACTINIC KERATOSIS.  - ADJACENT SMALL SOLAR LENTIGO.    This lesion is atypical and further treatment may be required. You will be contacted by your provider's office.       Gross 01/28/2025    Final                    Value:Patient ID/MRN:  [id number]   Pathology label MRN:  [id number]  The specimen is received in formalin labeled &quot;R forearm&quot;.  The specimen consists of 1 0.9 x 0.7 cm of tan-white tan-brown, folded, wrinkled, thinly shaved fragment of skin. The skin is remarkable for 0.1 x 0.1 cm tan-brown, flat, circular   lesion located 0.2 cm away from the nearest biopsy margin. The specimen is inked blue at the resection margin and trisected and submitted entirely in cassette WAE--1-ODILON Cortez  Grossing Technologist         Microscopic Exam 01/28/2025    Final                    Value:Sections show a focal area of atypia within the lower to mid epidermal layers associated with solar elastosis.  An adjacent area shows an elongation of the rete ridges in association with increased pigmentation of the basal cell layer. The underlying   dermis demonstrates solar elastosis.  Ki 67 immunohistochemical stain shows an elevated proliferative index that fails to span the full-thickness of the involved epidermis.  Appropriately reactive controls were reviewed.  Multiple levels were examined.      Disclaimer 01/28/2025 Unless the case is a 'gross only' or additional testing only, the final diagnosis for each specimen is based on a microscopic examination of appropriate tissue sections.   Final   Lab Visit on 01/10/2025   Component Date Value Ref Range  Status    Vit D, 25-Hydroxy 01/10/2025 71  30 - 96 ng/mL Final       Diagnoses:   Patient is a 74-year-old female with no significant past medical history who presents to tele Neurology Clinic due to evaluation of cognitive issues and memory loss.  Patient recently had a neuropsychological evaluation which was reassuring and only showed subtle changes in executive functioning but was overall reassuring.  Patient states she has a paternal family history of Alzheimer's and would like to make sure that if he does obtain this, that we can get ahead of it.  There is no family history of early-onset dementia.  Patient is not having any symptoms of getting lost while driving and continues to function as an  without any issues.  Patient denies losing track of time, getting confused or getting lost while driving as well as leaving things on the stove to burn.  We will obtain MRI of the brain to evaluate parenchyma structurally as well as obtain metabolic workup to make sure there is nothing reversible that could be causing cognitive issues.  Eventually we will probably end up getting a amyloid PET scan to further evaluate.    Medical Decision Makin) cognitive labs  2)  MRI brain  3) we will eventually get CT PET amyloid    Overall given reassurance.  They live in [place] for half the year in her going back at the end of April.  I will see the patient virtually on follow up.      I spent 30 minutes with the patient with >50% of the time spent with counseling and education regarding:    This is a consult performed through audio-visual using [employer] do. Pt and provider are in different locations. History and physical exam are limited due to the nature of this encounter.       Anita Montemayor MD           [1]   Current Outpatient Medications on File Prior to Visit   Medication Sig Dispense Refill    blood sugar diagnostic (FREESTYLE LITE STRIPS) Strp 1 strip by Misc.(Non-Drug;  Combo Route) route once daily. 100 each 3    blood-glucose meter kit To check glucose 1-3 times daily, to use with Accucheck or insurance preferred meter 1 each 0    calcium citrate-vitamin D3 315-200 mg (CITRACAL+D) 315 mg-5 mcg (200 unit) per tablet Take 2 tablets by mouth once daily.      ezetimibe-simvastatin 10-20 mg (VYTORIN) 10-20 mg per tablet       multivitamin (ONE DAILY MULTIVITAMIN) per tablet Take 1 tablet by mouth once daily.       No current facility-administered medications on file prior to visit.

## 2025-04-03 ENCOUNTER — HOSPITAL ENCOUNTER (OUTPATIENT)
Dept: RADIOLOGY | Facility: OTHER | Age: 75
Discharge: HOME | End: 2025-04-03
Attending: PSYCHIATRY & NEUROLOGY
Payer: MEDICARE

## 2025-04-03 DIAGNOSIS — R41.89 COGNITIVE CHANGES: ICD-10-CM

## 2025-04-03 DIAGNOSIS — R41.3 MEMORY LOSS: ICD-10-CM

## 2025-04-03 DIAGNOSIS — Z82.0 FAMILY HISTORY OF ALZHEIMER DISEASE: ICD-10-CM

## 2025-04-06 ENCOUNTER — HOSPITAL ENCOUNTER (OUTPATIENT)
Dept: RADIOLOGY | Facility: HOSPITAL | Age: 75
Discharge: HOME | End: 2025-04-06
Attending: PSYCHIATRY & NEUROLOGY
Payer: MEDICARE

## 2025-04-06 PROCEDURE — 70551 MRI BRAIN STEM W/O DYE: CPT | Mod: 26,,, | Performed by: RADIOLOGY

## 2025-04-06 PROCEDURE — 70551 MRI BRAIN STEM W/O DYE: CPT | Mod: TC

## 2025-04-08 DIAGNOSIS — R41.3 MEMORY LOSS: ICD-10-CM

## 2025-04-08 DIAGNOSIS — Z82.0 FAMILY HISTORY OF ALZHEIMER DISEASE: Primary | ICD-10-CM

## 2025-04-08 DIAGNOSIS — R41.89 COGNITIVE CHANGES: ICD-10-CM

## 2025-06-05 ENCOUNTER — HOSPITAL ENCOUNTER (OUTPATIENT)
Dept: RADIOLOGY | Facility: HOSPITAL | Age: 75
Discharge: HOME | End: 2025-06-05
Attending: PSYCHIATRY & NEUROLOGY
Payer: MEDICARE

## 2025-06-05 DIAGNOSIS — R41.3 MEMORY LOSS: ICD-10-CM

## 2025-06-05 DIAGNOSIS — Z82.0 FAMILY HISTORY OF ALZHEIMER DISEASE: ICD-10-CM

## 2025-06-05 DIAGNOSIS — R41.89 COGNITIVE CHANGES: ICD-10-CM

## 2025-06-05 PROCEDURE — 78814 PET IMAGE W/CT LMTD: CPT | Mod: TC

## 2025-06-05 PROCEDURE — A9586 FLORBETAPIR F18: HCPCS | Mod: JZ,TB | Performed by: PSYCHIATRY & NEUROLOGY

## 2025-06-05 RX ORDER — FLUDEOXYGLUCOSE F18 500 MCI/ML
10 INJECTION INTRAVENOUS
Status: DISCONTINUED | OUTPATIENT
Start: 2025-06-05 | End: 2025-06-05

## 2025-06-05 RX ADMIN — FLORBETAPIR F 18 8.96 MILLICURIE: 51 INJECTION, SOLUTION INTRAVENOUS at 02:06

## 2025-06-09 ENCOUNTER — PATIENT MESSAGE (OUTPATIENT)
Dept: NEUROLOGY | Facility: CLINIC | Age: 75
End: 2025-06-09
Payer: MEDICARE

## 2025-06-13 DIAGNOSIS — R73.01 IMPAIRED FASTING GLUCOSE: ICD-10-CM

## 2025-06-13 NOTE — TELEPHONE ENCOUNTER
Copied from CRM #[id number]. Topic: Medications - Medication Refill  >> Jun 13, 2025 12:36 PM Shahzad wrote:  Type: RX Refill Request    Who Called: Patient       Refill or New Rx: Refill     RX Name and Strength:blood sugar diagnostic (FREESTYLE LITE STRIPS) Strp      Preferred Pharmacy with phone number:   Paper copy mailed to address in [place].    [address] [place] [address]    Would the patient rather a call back or a response via [facility]?  call    Best Call Back Number: [phone]     Additional Information:

## 2025-06-13 NOTE — TELEPHONE ENCOUNTER
Refill Encounter    PCP Visits: Recent Visits  Date Type Provider Dept   25 Office Visit Allen Reyes NP [facility] Medicine   24 Office Visit Raisa Alonzo MD [facility]   Showing recent visits within past 360 days and meeting all other requirements  Future Appointments  No visits were found meeting these conditions.  Showing future appointments within next 720 days and meeting all other requirements      Last 3 Blood Pressure:   BP Readings from Last 3 Encounters:   25 (!) 100/57   25 103/69   24 100/72     Preferred Pharmacy:   [facility]/pharmacy #[id number] - [place] - [address]  [address]  [place] [address]  Phone: [phone] Fax: [phone]    Requested RX:  Requested Prescriptions     Pending Prescriptions Disp Refills    blood sugar diagnostic (FREESTYLE LITE STRIPS) Strp 100 each 3     Si strip by Misc.(Non-Drug; Combo Route) route once daily.      RX Route: Normal